# Patient Record
Sex: MALE | Race: WHITE | Employment: FULL TIME | URBAN - METROPOLITAN AREA
[De-identification: names, ages, dates, MRNs, and addresses within clinical notes are randomized per-mention and may not be internally consistent; named-entity substitution may affect disease eponyms.]

---

## 2018-11-19 ENCOUNTER — OFFICE VISIT (OUTPATIENT)
Dept: ENDOCRINOLOGY | Age: 28
End: 2018-11-19
Payer: COMMERCIAL

## 2018-11-19 VITALS
SYSTOLIC BLOOD PRESSURE: 116 MMHG | DIASTOLIC BLOOD PRESSURE: 81 MMHG | WEIGHT: 292.4 LBS | HEIGHT: 70 IN | HEART RATE: 70 BPM | BODY MASS INDEX: 41.86 KG/M2

## 2018-11-19 DIAGNOSIS — E66.01 MORBID OBESITY (HCC): ICD-10-CM

## 2018-11-19 DIAGNOSIS — E29.1 HYPOGONADISM MALE: Primary | ICD-10-CM

## 2018-11-19 DIAGNOSIS — E29.1 HYPOGONADISM MALE: ICD-10-CM

## 2018-11-19 DIAGNOSIS — N62 GYNECOMASTIA: ICD-10-CM

## 2018-11-19 DIAGNOSIS — E88.81 INSULIN RESISTANCE: ICD-10-CM

## 2018-11-19 DIAGNOSIS — R63.5 WEIGHT GAIN: ICD-10-CM

## 2018-11-19 PROBLEM — J30.2 SEASONAL ALLERGIES: Status: ACTIVE | Noted: 2018-11-19

## 2018-11-19 LAB
ANION GAP SERPL CALCULATED.3IONS-SCNC: 12 MEQ/L (ref 7–13)
BUN BLDV-MCNC: 14 MG/DL (ref 6–20)
CALCIUM SERPL-MCNC: 10.2 MG/DL (ref 8.6–10.2)
CHLORIDE BLD-SCNC: 106 MEQ/L (ref 98–107)
CO2: 27 MEQ/L (ref 22–29)
CREAT SERPL-MCNC: 0.85 MG/DL (ref 0.7–1.2)
GFR AFRICAN AMERICAN: >60
GFR NON-AFRICAN AMERICAN: >60
GLUCOSE BLD-MCNC: 87 MG/DL (ref 74–109)
HBA1C MFR BLD: 5.4 % (ref 4.8–5.9)
INSULIN: 15.9 UIU/ML (ref 2.6–24.9)
POTASSIUM SERPL-SCNC: 4.2 MEQ/L (ref 3.5–5.1)
SODIUM BLD-SCNC: 145 MEQ/L (ref 132–144)

## 2018-11-19 PROCEDURE — G8427 DOCREV CUR MEDS BY ELIG CLIN: HCPCS | Performed by: INTERNAL MEDICINE

## 2018-11-19 PROCEDURE — G8417 CALC BMI ABV UP PARAM F/U: HCPCS | Performed by: INTERNAL MEDICINE

## 2018-11-19 PROCEDURE — G8484 FLU IMMUNIZE NO ADMIN: HCPCS | Performed by: INTERNAL MEDICINE

## 2018-11-19 PROCEDURE — 99243 OFF/OP CNSLTJ NEW/EST LOW 30: CPT | Performed by: INTERNAL MEDICINE

## 2018-11-19 RX ORDER — TESTOSTERONE 16.2 MG/G
GEL TRANSDERMAL
Qty: 1 BOTTLE | Refills: 3 | Status: SHIPPED | OUTPATIENT
Start: 2018-11-19 | End: 2019-01-03 | Stop reason: SDUPTHER

## 2018-11-19 RX ORDER — MONTELUKAST SODIUM 10 MG/1
10 TABLET ORAL
COMMUNITY
Start: 2017-03-31 | End: 2021-07-19 | Stop reason: ALTCHOICE

## 2018-11-19 RX ORDER — FEXOFENADINE HYDROCHLORIDE 60 MG/1
60 TABLET, FILM COATED ORAL
COMMUNITY

## 2018-11-19 RX ORDER — AZELASTINE HYDROCHLORIDE 0.5 MG/ML
SOLUTION/ DROPS OPHTHALMIC
COMMUNITY
Start: 2017-03-31 | End: 2021-07-19 | Stop reason: ALTCHOICE

## 2018-11-19 RX ORDER — TRIAMCINOLONE ACETONIDE 55 UG/1
2 SPRAY, METERED NASAL
COMMUNITY
Start: 2017-03-31 | End: 2021-07-19 | Stop reason: ALTCHOICE

## 2018-11-19 NOTE — PROGRESS NOTES
Subjective:      Patient ID: Rylee Bowser is a 25 y.o. male. Patient referred for hypogonadism  Other   This is a new (Hypogonadism) problem. The current episode started more than 1 year ago. The problem has been waxing and waning. Associated symptoms include fatigue. Associated symptoms comments:  complains of lack of energy increasing weight was diagnosed 3 years ago at the [facility] and has been on Clomid treatment for some time with modest response patient does have 2 biological children denies any head trauma testicular injury use of anabolic steroid does occasionally drink beer over the weekend  MI at this time is 41.96. Exacerbated by: Obesity. Treatments tried: clomid  The treatment provided moderate relief. Testosterone was as low as 145 and up to 460+ after being on Clomid    Labs from 2015 normal lites deficits prolactin level    Reviewed from [facility] testosterone was 145 from June 2015   lab panel including ACTH cortisol D TSH prolactin FSH free T4  And the [facility] was normal from August 5, 2015    Patient tried the Clomid for hypogonadism with some relief last labs were done in 2016 results were reviewed      Patient Active Problem List   Diagnosis    Hypogonadism in male    Morbid obesity ([facility] [address]    Seasonal allergies         Social History     Social History    Marital status:      Spouse name: N/A    Number of children: N/A    Years of education: N/A     Occupational History    Not on file. Social History Main Topics    Smoking status: Never Smoker    Smokeless tobacco: Never Used    Alcohol use Not on file    Drug use: Unknown    Sexual activity: Not on file     Other Topics Concern    Not on file     Social History Narrative    No narrative on file     No family history on file. No Known Allergies      Current Outpatient Prescriptions:     azelastine (OPTIVAR) 0.05 % ophthalmic solution, Use 1 Drop in both eyes twice daily as needed. , Disp: , Rfl:    Standing Expiration Date:   2019    Basic Metabolic Panel     Standing Status:   Future     Number of Occurrences:   1     Standing Expiration Date:   2019    Hemoglobin A1C     Standing Status:   Future     Number of Occurrences:   1     Standing Expiration Date:   2019    Insulin, Total     Standing Status:   Future     Number of Occurrences:   1     Standing Expiration Date:   2019    Testosterone Free And Total Male     Standing Status:   Future     Number of Occurrences:   1     Standing Expiration Date:   2019          Orders Placed This Encounter   Medications    Testosterone (ANDROGEL) 20.25 MG/ACT (1.62%) GEL gel     Si pump depression daily.      Dispense:  1 Bottle     Refill:  3       Discussed about testosterone replacement goal of testosterone reading of 5-600 range advised about transference 2 children for that testosterone gel follow up in 4-6 weeks time  Than 50% of 40 minutes spent in patient education counseling review of prior labs data  Made aware of insulin resistance obesity and risks for diabetes    Heidy Zamora MD

## 2018-11-22 LAB
SEX HORMONE BINDING GLOBULIN: 13 NMOL/L (ref 11–80)
TESTOSTERONE FREE PERCENT: 2.6 % (ref 1.6–2.9)
TESTOSTERONE FREE, CALC: 87 PG/ML (ref 47–244)
TESTOSTERONE TOTAL-MALE: 329 NG/DL (ref 300–1080)

## 2018-12-17 DIAGNOSIS — E29.1 HYPOGONADISM MALE: ICD-10-CM

## 2018-12-19 LAB
SEX HORMONE BINDING GLOBULIN: 12 NMOL/L (ref 11–80)
TESTOSTERONE FREE PERCENT: 2.7 % (ref 1.6–2.9)
TESTOSTERONE FREE, CALC: 63 PG/ML (ref 47–244)
TESTOSTERONE TOTAL-MALE: 238 NG/DL (ref 300–1080)

## 2019-01-03 ENCOUNTER — OFFICE VISIT (OUTPATIENT)
Dept: ENDOCRINOLOGY | Age: 29
End: 2019-01-03
Payer: COMMERCIAL

## 2019-01-03 VITALS
OXYGEN SATURATION: 93 % | HEIGHT: 69 IN | DIASTOLIC BLOOD PRESSURE: 78 MMHG | SYSTOLIC BLOOD PRESSURE: 127 MMHG | WEIGHT: 303 LBS | BODY MASS INDEX: 44.88 KG/M2 | HEART RATE: 69 BPM

## 2019-01-03 DIAGNOSIS — E29.1 HYPOGONADISM IN MALE: Primary | ICD-10-CM

## 2019-01-03 DIAGNOSIS — E29.1 HYPOGONADISM MALE: ICD-10-CM

## 2019-01-03 PROCEDURE — G8417 CALC BMI ABV UP PARAM F/U: HCPCS | Performed by: INTERNAL MEDICINE

## 2019-01-03 PROCEDURE — 99213 OFFICE O/P EST LOW 20 MIN: CPT | Performed by: INTERNAL MEDICINE

## 2019-01-03 PROCEDURE — G8427 DOCREV CUR MEDS BY ELIG CLIN: HCPCS | Performed by: INTERNAL MEDICINE

## 2019-01-03 PROCEDURE — 1036F TOBACCO NON-USER: CPT | Performed by: INTERNAL MEDICINE

## 2019-01-03 PROCEDURE — G8484 FLU IMMUNIZE NO ADMIN: HCPCS | Performed by: INTERNAL MEDICINE

## 2019-01-03 RX ORDER — TESTOSTERONE 16.2 MG/G
GEL TRANSDERMAL
Qty: 2 BOTTLE | Refills: 3 | Status: SHIPPED | OUTPATIENT
Start: 2019-01-03 | End: 2019-02-20 | Stop reason: SDUPTHER

## 2019-02-12 DIAGNOSIS — E29.1 HYPOGONADISM IN MALE: ICD-10-CM

## 2019-02-14 ENCOUNTER — OFFICE VISIT (OUTPATIENT)
Dept: ENDOCRINOLOGY | Age: 29
End: 2019-02-14
Payer: COMMERCIAL

## 2019-02-14 VITALS
OXYGEN SATURATION: 96 % | DIASTOLIC BLOOD PRESSURE: 69 MMHG | BODY MASS INDEX: 43.69 KG/M2 | WEIGHT: 295 LBS | HEART RATE: 77 BPM | HEIGHT: 69 IN | SYSTOLIC BLOOD PRESSURE: 105 MMHG

## 2019-02-14 DIAGNOSIS — R63.5 WEIGHT GAIN: ICD-10-CM

## 2019-02-14 DIAGNOSIS — E29.1 HYPOGONADISM IN MALE: Primary | ICD-10-CM

## 2019-02-14 PROCEDURE — G8484 FLU IMMUNIZE NO ADMIN: HCPCS | Performed by: INTERNAL MEDICINE

## 2019-02-14 PROCEDURE — 1036F TOBACCO NON-USER: CPT | Performed by: INTERNAL MEDICINE

## 2019-02-14 PROCEDURE — G8427 DOCREV CUR MEDS BY ELIG CLIN: HCPCS | Performed by: INTERNAL MEDICINE

## 2019-02-14 PROCEDURE — 99213 OFFICE O/P EST LOW 20 MIN: CPT | Performed by: INTERNAL MEDICINE

## 2019-02-14 PROCEDURE — G8417 CALC BMI ABV UP PARAM F/U: HCPCS | Performed by: INTERNAL MEDICINE

## 2019-02-15 LAB
SEX HORMONE BINDING GLOBULIN: 11 NMOL/L (ref 11–80)
TESTOSTERONE FREE PERCENT: 2.7 % (ref 1.6–2.9)
TESTOSTERONE FREE, CALC: 42 PG/ML (ref 47–244)
TESTOSTERONE TOTAL-MALE: 159 NG/DL (ref 300–1080)

## 2019-02-20 DIAGNOSIS — E29.1 HYPOGONADISM MALE: ICD-10-CM

## 2019-02-20 RX ORDER — TESTOSTERONE 16.2 MG/G
GEL TRANSDERMAL
Qty: 2 BOTTLE | Refills: 3 | Status: SHIPPED | OUTPATIENT
Start: 2019-02-20 | End: 2019-04-23 | Stop reason: ALTCHOICE

## 2019-02-21 ENCOUNTER — TELEPHONE (OUTPATIENT)
Dept: ENDOCRINOLOGY | Age: 29
End: 2019-02-21

## 2019-04-15 DIAGNOSIS — E29.1 HYPOGONADISM IN MALE: ICD-10-CM

## 2019-04-18 LAB
SEX HORMONE BINDING GLOBULIN: 13 NMOL/L (ref 11–80)
TESTOSTERONE FREE PERCENT: 2.6 % (ref 1.6–2.9)
TESTOSTERONE FREE, CALC: 65 PG/ML (ref 47–244)
TESTOSTERONE TOTAL-MALE: 252 NG/DL (ref 300–1080)

## 2019-04-23 ENCOUNTER — OFFICE VISIT (OUTPATIENT)
Dept: ENDOCRINOLOGY | Age: 29
End: 2019-04-23
Payer: COMMERCIAL

## 2019-04-23 VITALS
WEIGHT: 297 LBS | SYSTOLIC BLOOD PRESSURE: 112 MMHG | HEART RATE: 62 BPM | DIASTOLIC BLOOD PRESSURE: 77 MMHG | BODY MASS INDEX: 43.99 KG/M2 | HEIGHT: 69 IN

## 2019-04-23 DIAGNOSIS — E66.01 MORBID OBESITY (HCC): ICD-10-CM

## 2019-04-23 DIAGNOSIS — E29.1 HYPOGONADISM IN MALE: Primary | ICD-10-CM

## 2019-04-23 PROCEDURE — G8417 CALC BMI ABV UP PARAM F/U: HCPCS | Performed by: INTERNAL MEDICINE

## 2019-04-23 PROCEDURE — 99213 OFFICE O/P EST LOW 20 MIN: CPT | Performed by: INTERNAL MEDICINE

## 2019-04-23 PROCEDURE — 1036F TOBACCO NON-USER: CPT | Performed by: INTERNAL MEDICINE

## 2019-04-23 PROCEDURE — G8427 DOCREV CUR MEDS BY ELIG CLIN: HCPCS | Performed by: INTERNAL MEDICINE

## 2019-04-23 RX ORDER — TESTOSTERONE CYPIONATE 200 MG/ML
VIAL (ML) INTRAMUSCULAR
Qty: 10 ML | Refills: 1 | Status: SHIPPED | OUTPATIENT
Start: 2019-04-23 | End: 2019-09-18 | Stop reason: SDUPTHER

## 2019-04-23 NOTE — PROGRESS NOTES
Subjective:      Patient ID: Miguelina Jackson is a 25 y.o. male. 8  week f/u on hypogonadism  Other   This is a chronic (Hypogonadism) problem. The current episode started more than 1 year ago. The problem has been waxing and waning. Associated symptoms include fatigue. Associated symptoms comments:    . Exacerbated by: Obesity. Treatments tried: androgel. Testosterone level done     Last level still low at 252 target testosterone level 500-600 range  Obesity Body mass index is 43.86 kg/m². No Known Allergies      Current Outpatient Medications:     azelastine (OPTIVAR) 0.05 % ophthalmic solution, Use 1 Drop in both eyes twice daily as needed. , Disp: , Rfl:     fexofenadine (ALLEGRA) 60 MG tablet, Take 60 mg by mouth, Disp: , Rfl:     montelukast (SINGULAIR) 10 MG tablet, Take 10 mg by mouth, Disp: , Rfl:     triamcinolone (NASACORT) 55 MCG/ACT nasal inhaler, 2 sprays, Disp: , Rfl:     Testosterone (ANDROGEL) 20.25 MG/ACT (1.62%) GEL gel, 4 pump depression daily. , Disp: 2 Bottle, Rfl: 3      Review of Systems   Constitutional: Positive for fatigue. All other systems reviewed and are negative. Vitals:    19 0925   BP: 112/77   Pulse: 62   Weight: 297 lb (134.7 kg)   Height: 5' 9\" (1.753 m)       Objective:   Physical Exam   Constitutional: He appears well-developed and well-nourished. Neck: Normal range of motion. Cardiovascular: Normal rate. Pulmonary/Chest: Effort normal.   Musculoskeletal: Normal range of motion. Neurological: He is alert. Assessment:       Diagnosis Orders   1. Hypogonadism in male     2.  Morbid obesity ([facility] [address]             Plan:      Orders Placed This Encounter   Procedures    Testosterone Free And Total Male     Standing Status:   Future     Standing Expiration Date:   2020     Orders Placed This Encounter   Medications    Testosterone Cypionate 200 MG/ML SOLN     Si cc every 2 weeks     Dispense:  10 mL     Refill:  1    SYRINGE-NEEDLE, DISP, 3 ML (B-D INTEGRA SYRINGE) 22G X 1-1/2\" 3 ML MISC     Si each by Does not apply route daily     Dispense:  20 each     Refill:  6     Medications Discontinued During This Encounter   Medication Reason    Testosterone (ANDROGEL) 20.25 MG/ACT (1.62%) GEL gel Therapy completed                 Luisa Ewing MD

## 2019-04-30 ENCOUNTER — NURSE ONLY (OUTPATIENT)
Dept: ENDOCRINOLOGY | Age: 29
End: 2019-04-30
Payer: COMMERCIAL

## 2019-04-30 DIAGNOSIS — E29.1 HYPOGONADISM IN MALE: Primary | ICD-10-CM

## 2019-04-30 PROCEDURE — 96372 THER/PROPH/DIAG INJ SC/IM: CPT | Performed by: INTERNAL MEDICINE

## 2019-04-30 RX ORDER — TESTOSTERONE CYPIONATE 200 MG/ML
200 INJECTION INTRAMUSCULAR ONCE
Status: COMPLETED | OUTPATIENT
Start: 2019-04-30 | End: 2019-04-30

## 2019-04-30 RX ADMIN — TESTOSTERONE CYPIONATE 200 MG: 200 INJECTION INTRAMUSCULAR at 16:23

## 2019-04-30 NOTE — PROGRESS NOTES
Patient given Testosterone 200mg IM right gluteal..  Self administration instructions given. Plans to have wife administer injections. Patient tolerated injection well.     Administrations This Visit     testosterone cypionate (DEPOTESTOTERONE CYPIONATE) injection 200 mg     Admin Date  04/30/2019 Action  Given Dose  200 mg Route  Intramuscular Administered By  Chapo Trevino

## 2019-05-07 ENCOUNTER — OFFICE VISIT (OUTPATIENT)
Dept: FAMILY MEDICINE CLINIC | Age: 29
End: 2019-05-07
Payer: COMMERCIAL

## 2019-05-07 VITALS
WEIGHT: 298 LBS | OXYGEN SATURATION: 98 % | TEMPERATURE: 97.7 F | SYSTOLIC BLOOD PRESSURE: 102 MMHG | HEART RATE: 67 BPM | BODY MASS INDEX: 44.01 KG/M2 | DIASTOLIC BLOOD PRESSURE: 67 MMHG

## 2019-05-07 DIAGNOSIS — J02.9 SORE THROAT: ICD-10-CM

## 2019-05-07 DIAGNOSIS — J02.0 ACUTE STREPTOCOCCAL PHARYNGITIS: Primary | ICD-10-CM

## 2019-05-07 DIAGNOSIS — E29.1 HYPOGONADISM IN MALE: ICD-10-CM

## 2019-05-07 LAB — S PYO AG THROAT QL: POSITIVE

## 2019-05-07 PROCEDURE — 99213 OFFICE O/P EST LOW 20 MIN: CPT | Performed by: NURSE PRACTITIONER

## 2019-05-07 PROCEDURE — 87880 STREP A ASSAY W/OPTIC: CPT | Performed by: NURSE PRACTITIONER

## 2019-05-07 PROCEDURE — G8427 DOCREV CUR MEDS BY ELIG CLIN: HCPCS | Performed by: NURSE PRACTITIONER

## 2019-05-07 PROCEDURE — G8417 CALC BMI ABV UP PARAM F/U: HCPCS | Performed by: NURSE PRACTITIONER

## 2019-05-07 PROCEDURE — 1036F TOBACCO NON-USER: CPT | Performed by: NURSE PRACTITIONER

## 2019-05-07 RX ORDER — TESTOSTERONE CYPIONATE 200 MG/ML
INJECTION INTRAMUSCULAR
COMMUNITY
Start: 2019-04-23 | End: 2019-05-14 | Stop reason: SDUPTHER

## 2019-05-07 RX ORDER — AMOXICILLIN 875 MG/1
875 TABLET, COATED ORAL 2 TIMES DAILY
Qty: 20 TABLET | Refills: 0 | Status: SHIPPED | OUTPATIENT
Start: 2019-05-07 | End: 2019-05-17

## 2019-05-07 ASSESSMENT — ENCOUNTER SYMPTOMS
VOMITING: 0
NAUSEA: 0
CHANGE IN BOWEL HABIT: 0
SORE THROAT: 1
COUGH: 0

## 2019-05-07 NOTE — PROGRESS NOTES
on file   Social History Narrative    Not on file       Allergies:  Patient has no known allergies. Review of Systems   Constitutional: Positive for fatigue and fever. HENT: Positive for congestion and sore throat. Respiratory: Negative for cough. Gastrointestinal: Negative for change in bowel habit, nausea and vomiting. Objective:   /67 (Site: Right Upper Arm, Position: Sitting, Cuff Size: Large Adult)   Pulse 67   Temp 97.7 °F (36.5 °C) (Temporal)   Wt 298 lb (135.2 kg)   SpO2 98%   BMI 44.01 kg/m²   Physical Exam   Constitutional: He appears well-developed and well-nourished. He does not appear ill. No distress. HENT:   Right Ear: Tympanic membrane normal.   Left Ear: Tympanic membrane normal.   Nose: Nose normal.   Mouth/Throat: Posterior oropharyngeal erythema present. Palatal petechiae    Cardiovascular: Normal rate and regular rhythm. No murmur heard. Pulmonary/Chest: Effort normal and breath sounds normal. No respiratory distress. Skin: He is not diaphoretic. Results for POC orders placed in visit on 05/07/19   POCT rapid strep A   Result Value Ref Range    Strep A Ag Positive (A) None Detected         Assessment:       Diagnosis Orders   1. Acute streptococcal pharyngitis  amoxicillin (AMOXIL) 875 MG tablet   2. Sore throat  POCT rapid strep A           Plan:      Orders Placed This Encounter   Procedures    POCT rapid strep A     Orders Placed This Encounter   Medications    amoxicillin (AMOXIL) 875 MG tablet     Sig: Take 1 tablet by mouth 2 times daily for 10 days     Dispense:  20 tablet     Refill:  0     Positive for strep. Advised to take full course of antibiotic, even if feeling better. Educated on symptomatic therapies. Advised that strep is passed through contact with oral secretions so do not share cups, utensils, etc. Advised to get new toothbrush as well. Return to PCP if no improvement in 3-4 days. Patient verbalized understanding.        Return if symptoms worsen or fail to improve.     Eliza Lambert, APRN - CNP

## 2019-05-09 LAB
SEX HORMONE BINDING GLOBULIN: 10 NMOL/L (ref 11–80)
TESTOSTERONE FREE PERCENT: 2.9 % (ref 1.6–2.9)
TESTOSTERONE FREE, CALC: 116 PG/ML (ref 47–244)
TESTOSTERONE TOTAL-MALE: 405 NG/DL (ref 300–1080)

## 2019-05-14 ENCOUNTER — NURSE ONLY (OUTPATIENT)
Dept: ENDOCRINOLOGY | Age: 29
End: 2019-05-14
Payer: COMMERCIAL

## 2019-05-14 DIAGNOSIS — E29.1 HYPOGONADISM MALE: Primary | ICD-10-CM

## 2019-05-14 PROCEDURE — 96372 THER/PROPH/DIAG INJ SC/IM: CPT | Performed by: INTERNAL MEDICINE

## 2019-05-14 RX ORDER — TESTOSTERONE CYPIONATE 200 MG/ML
INJECTION INTRAMUSCULAR
Qty: 10 ML | Refills: 1 | Status: SHIPPED | OUTPATIENT
Start: 2019-05-14 | End: 2019-06-10 | Stop reason: SDUPTHER

## 2019-05-14 RX ORDER — TESTOSTERONE CYPIONATE 200 MG/ML
200 INJECTION INTRAMUSCULAR ONCE
Status: COMPLETED | OUTPATIENT
Start: 2019-05-14 | End: 2019-05-14

## 2019-05-14 RX ADMIN — TESTOSTERONE CYPIONATE 200 MG: 200 INJECTION INTRAMUSCULAR at 15:21

## 2019-05-14 NOTE — PROGRESS NOTES
Patient given Testosterone 200mg IM left gluteal..  Will return in 2 weeks for next injection    Patient tolerated injection well.     Administrations This Visit     testosterone cypionate (DEPOTESTOTERONE CYPIONATE) injection 200 mg     Admin Date  05/14/2019 Action  Given Dose  200 mg Route  Intramuscular Administered By  Lavinia Wesley

## 2019-05-28 ENCOUNTER — NURSE ONLY (OUTPATIENT)
Dept: ENDOCRINOLOGY | Age: 29
End: 2019-05-28
Payer: COMMERCIAL

## 2019-05-28 DIAGNOSIS — E29.1 HYPOGONADISM MALE: Primary | ICD-10-CM

## 2019-05-28 PROCEDURE — 96372 THER/PROPH/DIAG INJ SC/IM: CPT | Performed by: INTERNAL MEDICINE

## 2019-05-28 RX ORDER — TESTOSTERONE CYPIONATE 200 MG/ML
200 INJECTION INTRAMUSCULAR ONCE
Status: COMPLETED | OUTPATIENT
Start: 2019-05-28 | End: 2019-05-28

## 2019-05-28 RX ADMIN — TESTOSTERONE CYPIONATE 200 MG: 200 INJECTION INTRAMUSCULAR at 15:55

## 2019-06-10 DIAGNOSIS — E29.1 HYPOGONADISM MALE: ICD-10-CM

## 2019-06-10 RX ORDER — TESTOSTERONE CYPIONATE 200 MG/ML
INJECTION INTRAMUSCULAR
Qty: 10 ML | Refills: 1 | Status: SHIPPED | OUTPATIENT
Start: 2019-06-10 | End: 2019-07-29 | Stop reason: SDUPTHER

## 2019-06-11 ENCOUNTER — NURSE ONLY (OUTPATIENT)
Dept: ENDOCRINOLOGY | Age: 29
End: 2019-06-11
Payer: COMMERCIAL

## 2019-06-11 DIAGNOSIS — E29.1 HYPOGONADISM MALE: Primary | ICD-10-CM

## 2019-06-11 PROCEDURE — 96372 THER/PROPH/DIAG INJ SC/IM: CPT | Performed by: INTERNAL MEDICINE

## 2019-06-11 RX ORDER — TESTOSTERONE CYPIONATE 200 MG/ML
200 INJECTION INTRAMUSCULAR ONCE
Status: COMPLETED | OUTPATIENT
Start: 2019-06-11 | End: 2019-06-11

## 2019-06-11 RX ADMIN — TESTOSTERONE CYPIONATE 200 MG: 200 INJECTION INTRAMUSCULAR at 15:58

## 2019-06-11 NOTE — PROGRESS NOTES
Patient given Testosterone 200mg IM left gluteal..  Will return in 2 weeks for next injection    Patient tolerated injection well.     Administrations This Visit     testosterone cypionate (DEPOTESTOTERONE CYPIONATE) injection 200 mg     Admin Date  06/11/2019 Action  Given Dose  200 mg Route  Intramuscular Administered By  Sunday Fay LPN

## 2019-06-25 ENCOUNTER — NURSE ONLY (OUTPATIENT)
Dept: ENDOCRINOLOGY | Age: 29
End: 2019-06-25
Payer: COMMERCIAL

## 2019-06-25 DIAGNOSIS — E29.1 HYPOGONADISM MALE: Primary | ICD-10-CM

## 2019-06-25 PROCEDURE — 96372 THER/PROPH/DIAG INJ SC/IM: CPT | Performed by: INTERNAL MEDICINE

## 2019-06-25 RX ORDER — TESTOSTERONE CYPIONATE 200 MG/ML
200 INJECTION INTRAMUSCULAR ONCE
Status: COMPLETED | OUTPATIENT
Start: 2019-06-25 | End: 2019-06-25

## 2019-06-25 RX ADMIN — TESTOSTERONE CYPIONATE 200 MG: 200 INJECTION INTRAMUSCULAR at 15:22

## 2019-07-17 ENCOUNTER — NURSE ONLY (OUTPATIENT)
Dept: ENDOCRINOLOGY | Age: 29
End: 2019-07-17
Payer: COMMERCIAL

## 2019-07-17 DIAGNOSIS — E29.1 HYPOGONADISM MALE: Primary | ICD-10-CM

## 2019-07-17 PROCEDURE — 96372 THER/PROPH/DIAG INJ SC/IM: CPT | Performed by: INTERNAL MEDICINE

## 2019-07-17 RX ORDER — TESTOSTERONE CYPIONATE 200 MG/ML
200 INJECTION INTRAMUSCULAR ONCE
Status: COMPLETED | OUTPATIENT
Start: 2019-07-17 | End: 2019-07-17

## 2019-07-17 RX ADMIN — TESTOSTERONE CYPIONATE 200 MG: 200 INJECTION INTRAMUSCULAR at 15:36

## 2019-07-24 ENCOUNTER — TELEPHONE (OUTPATIENT)
Dept: ENDOCRINOLOGY | Age: 29
End: 2019-07-24

## 2019-07-24 DIAGNOSIS — E29.1 HYPOGONADISM IN MALE: ICD-10-CM

## 2019-07-24 DIAGNOSIS — E29.1 HYPOGONADISM IN MALE: Primary | ICD-10-CM

## 2019-07-26 LAB
SEX HORMONE BINDING GLOBULIN: 12 NMOL/L (ref 11–80)
TESTOSTERONE FREE PERCENT: 2.8 % (ref 1.6–2.9)
TESTOSTERONE FREE, CALC: 153 PG/ML (ref 47–244)
TESTOSTERONE TOTAL-MALE: 548 NG/DL (ref 300–1080)

## 2019-07-29 ENCOUNTER — OFFICE VISIT (OUTPATIENT)
Dept: ENDOCRINOLOGY | Age: 29
End: 2019-07-29
Payer: COMMERCIAL

## 2019-07-29 VITALS
DIASTOLIC BLOOD PRESSURE: 69 MMHG | SYSTOLIC BLOOD PRESSURE: 109 MMHG | HEART RATE: 61 BPM | WEIGHT: 301 LBS | HEIGHT: 69 IN | BODY MASS INDEX: 44.58 KG/M2

## 2019-07-29 DIAGNOSIS — E29.1 HYPOGONADISM IN MALE: Primary | ICD-10-CM

## 2019-07-29 DIAGNOSIS — E29.1 HYPOGONADISM MALE: ICD-10-CM

## 2019-07-29 PROCEDURE — 99213 OFFICE O/P EST LOW 20 MIN: CPT | Performed by: INTERNAL MEDICINE

## 2019-07-29 PROCEDURE — 1036F TOBACCO NON-USER: CPT | Performed by: INTERNAL MEDICINE

## 2019-07-29 PROCEDURE — G8427 DOCREV CUR MEDS BY ELIG CLIN: HCPCS | Performed by: INTERNAL MEDICINE

## 2019-07-29 PROCEDURE — G8417 CALC BMI ABV UP PARAM F/U: HCPCS | Performed by: INTERNAL MEDICINE

## 2019-07-29 RX ORDER — TESTOSTERONE CYPIONATE 200 MG/ML
INJECTION INTRAMUSCULAR
Qty: 10 ML | Refills: 1 | Status: SHIPPED | OUTPATIENT
Start: 2019-07-29 | End: 2019-09-03 | Stop reason: SDUPTHER

## 2019-07-31 ENCOUNTER — NURSE ONLY (OUTPATIENT)
Dept: ENDOCRINOLOGY | Age: 29
End: 2019-07-31
Payer: COMMERCIAL

## 2019-07-31 DIAGNOSIS — E29.1 HYPOGONADISM IN MALE: Primary | ICD-10-CM

## 2019-07-31 PROCEDURE — 96372 THER/PROPH/DIAG INJ SC/IM: CPT | Performed by: INTERNAL MEDICINE

## 2019-07-31 RX ORDER — TESTOSTERONE CYPIONATE 200 MG/ML
200 INJECTION INTRAMUSCULAR ONCE
Status: COMPLETED | OUTPATIENT
Start: 2019-07-31 | End: 2019-07-31

## 2019-07-31 RX ADMIN — TESTOSTERONE CYPIONATE 200 MG: 200 INJECTION INTRAMUSCULAR at 13:58

## 2019-08-21 ENCOUNTER — NURSE ONLY (OUTPATIENT)
Dept: ENDOCRINOLOGY | Age: 29
End: 2019-08-21
Payer: COMMERCIAL

## 2019-08-21 DIAGNOSIS — E29.1 HYPOGONADISM IN MALE: Primary | ICD-10-CM

## 2019-08-21 PROCEDURE — 96372 THER/PROPH/DIAG INJ SC/IM: CPT | Performed by: INTERNAL MEDICINE

## 2019-08-21 RX ORDER — TESTOSTERONE CYPIONATE 200 MG/ML
200 INJECTION INTRAMUSCULAR ONCE
Status: COMPLETED | OUTPATIENT
Start: 2019-08-21 | End: 2019-08-21

## 2019-08-21 RX ADMIN — TESTOSTERONE CYPIONATE 200 MG: 200 INJECTION INTRAMUSCULAR at 13:27

## 2019-09-03 DIAGNOSIS — E29.1 HYPOGONADISM MALE: ICD-10-CM

## 2019-09-04 ENCOUNTER — NURSE ONLY (OUTPATIENT)
Dept: ENDOCRINOLOGY | Age: 29
End: 2019-09-04
Payer: COMMERCIAL

## 2019-09-04 DIAGNOSIS — E29.1 HYPOGONADISM MALE: Primary | ICD-10-CM

## 2019-09-04 PROCEDURE — 96372 THER/PROPH/DIAG INJ SC/IM: CPT | Performed by: INTERNAL MEDICINE

## 2019-09-04 RX ORDER — TESTOSTERONE CYPIONATE 200 MG/ML
200 INJECTION INTRAMUSCULAR ONCE
Status: COMPLETED | OUTPATIENT
Start: 2019-09-04 | End: 2019-09-04

## 2019-09-04 RX ORDER — TESTOSTERONE CYPIONATE 200 MG/ML
INJECTION INTRAMUSCULAR
Qty: 10 ML | Refills: 1 | Status: SHIPPED | OUTPATIENT
Start: 2019-09-04 | End: 2020-01-29 | Stop reason: SDUPTHER

## 2019-09-04 RX ADMIN — TESTOSTERONE CYPIONATE 200 MG: 200 INJECTION INTRAMUSCULAR at 13:29

## 2019-09-18 ENCOUNTER — NURSE ONLY (OUTPATIENT)
Dept: ENDOCRINOLOGY | Age: 29
End: 2019-09-18
Payer: COMMERCIAL

## 2019-09-18 DIAGNOSIS — E29.1 HYPOGONADISM IN MALE: ICD-10-CM

## 2019-09-18 PROCEDURE — 96372 THER/PROPH/DIAG INJ SC/IM: CPT | Performed by: INTERNAL MEDICINE

## 2019-09-18 RX ORDER — TESTOSTERONE CYPIONATE 200 MG/ML
VIAL (ML) INTRAMUSCULAR
Qty: 10 ML | Refills: 1 | Status: SHIPPED | OUTPATIENT
Start: 2019-09-18 | End: 2019-10-30

## 2019-09-18 RX ORDER — TESTOSTERONE CYPIONATE 200 MG/ML
VIAL (ML) INTRAMUSCULAR
Qty: 10 ML | Refills: 1 | Status: SHIPPED | OUTPATIENT
Start: 2019-09-18 | End: 2019-09-18 | Stop reason: SDUPTHER

## 2019-09-18 RX ORDER — TESTOSTERONE CYPIONATE 200 MG/ML
200 INJECTION INTRAMUSCULAR ONCE
Status: COMPLETED | OUTPATIENT
Start: 2019-09-18 | End: 2019-09-18

## 2019-09-18 RX ADMIN — TESTOSTERONE CYPIONATE 200 MG: 200 INJECTION INTRAMUSCULAR at 16:04

## 2019-09-18 NOTE — PROGRESS NOTES
Patient given Testosterone 200mg IM right gluteal..  Will return in 2 weeks for next injection    Patient tolerated injection well.     Administrations This Visit     testosterone cypionate (DEPOTESTOTERONE CYPIONATE) injection 200 mg     Admin Date  09/18/2019 Action  Given Dose  200 mg Route  Intramuscular Administered By  Kari Obrien MA

## 2019-10-09 ENCOUNTER — NURSE ONLY (OUTPATIENT)
Dept: ENDOCRINOLOGY | Age: 29
End: 2019-10-09
Payer: COMMERCIAL

## 2019-10-09 DIAGNOSIS — E29.1 HYPOGONADISM IN MALE: Primary | ICD-10-CM

## 2019-10-09 PROCEDURE — 96372 THER/PROPH/DIAG INJ SC/IM: CPT | Performed by: INTERNAL MEDICINE

## 2019-10-09 RX ORDER — TESTOSTERONE CYPIONATE 200 MG/ML
200 INJECTION INTRAMUSCULAR ONCE
Status: COMPLETED | OUTPATIENT
Start: 2019-10-09 | End: 2019-10-09

## 2019-10-09 RX ADMIN — TESTOSTERONE CYPIONATE 200 MG: 200 INJECTION INTRAMUSCULAR at 13:29

## 2019-10-30 ENCOUNTER — NURSE ONLY (OUTPATIENT)
Dept: ENDOCRINOLOGY | Age: 29
End: 2019-10-30
Payer: COMMERCIAL

## 2019-10-30 DIAGNOSIS — E29.1 HYPOGONADISM IN MALE: Primary | ICD-10-CM

## 2019-10-30 PROCEDURE — 96372 THER/PROPH/DIAG INJ SC/IM: CPT | Performed by: INTERNAL MEDICINE

## 2019-10-30 RX ORDER — TESTOSTERONE CYPIONATE 200 MG/ML
200 INJECTION INTRAMUSCULAR ONCE
Status: COMPLETED | OUTPATIENT
Start: 2019-10-30 | End: 2019-10-30

## 2019-10-30 RX ADMIN — TESTOSTERONE CYPIONATE 200 MG: 200 INJECTION INTRAMUSCULAR at 13:33

## 2019-11-13 ENCOUNTER — NURSE ONLY (OUTPATIENT)
Dept: ENDOCRINOLOGY | Age: 29
End: 2019-11-13
Payer: COMMERCIAL

## 2019-11-13 DIAGNOSIS — E29.1 HYPOGONADISM IN MALE: Primary | ICD-10-CM

## 2019-11-13 PROCEDURE — 96372 THER/PROPH/DIAG INJ SC/IM: CPT | Performed by: INTERNAL MEDICINE

## 2019-11-13 RX ORDER — TESTOSTERONE CYPIONATE 200 MG/ML
200 INJECTION INTRAMUSCULAR ONCE
Status: COMPLETED | OUTPATIENT
Start: 2019-11-13 | End: 2019-11-13

## 2019-11-13 RX ADMIN — TESTOSTERONE CYPIONATE 200 MG: 200 INJECTION INTRAMUSCULAR at 13:28

## 2019-11-27 ENCOUNTER — NURSE ONLY (OUTPATIENT)
Dept: ENDOCRINOLOGY | Age: 29
End: 2019-11-27
Payer: COMMERCIAL

## 2019-11-27 DIAGNOSIS — E29.1 HYPOGONADISM IN MALE: Primary | ICD-10-CM

## 2019-11-27 PROCEDURE — 96372 THER/PROPH/DIAG INJ SC/IM: CPT | Performed by: INTERNAL MEDICINE

## 2019-11-27 RX ORDER — TESTOSTERONE CYPIONATE 200 MG/ML
200 INJECTION INTRAMUSCULAR ONCE
Status: COMPLETED | OUTPATIENT
Start: 2019-11-27 | End: 2019-11-27

## 2019-11-27 RX ADMIN — TESTOSTERONE CYPIONATE 200 MG: 200 INJECTION INTRAMUSCULAR at 15:24

## 2019-12-11 ENCOUNTER — NURSE ONLY (OUTPATIENT)
Dept: ENDOCRINOLOGY | Age: 29
End: 2019-12-11
Payer: COMMERCIAL

## 2019-12-11 DIAGNOSIS — E29.1 HYPOGONADISM IN MALE: Primary | ICD-10-CM

## 2019-12-11 PROCEDURE — 96372 THER/PROPH/DIAG INJ SC/IM: CPT | Performed by: INTERNAL MEDICINE

## 2019-12-11 RX ORDER — TESTOSTERONE CYPIONATE 200 MG/ML
200 INJECTION INTRAMUSCULAR ONCE
Status: COMPLETED | OUTPATIENT
Start: 2019-12-11 | End: 2019-12-11

## 2019-12-11 RX ADMIN — TESTOSTERONE CYPIONATE 200 MG: 200 INJECTION INTRAMUSCULAR at 15:26

## 2019-12-23 ENCOUNTER — OFFICE VISIT (OUTPATIENT)
Dept: FAMILY MEDICINE CLINIC | Age: 29
End: 2019-12-23
Payer: COMMERCIAL

## 2019-12-23 DIAGNOSIS — E29.1 HYPOGONADISM IN MALE: Primary | ICD-10-CM

## 2019-12-23 PROCEDURE — G8428 CUR MEDS NOT DOCUMENT: HCPCS | Performed by: FAMILY MEDICINE

## 2019-12-23 PROCEDURE — G8417 CALC BMI ABV UP PARAM F/U: HCPCS | Performed by: FAMILY MEDICINE

## 2019-12-23 PROCEDURE — 99211 OFF/OP EST MAY X REQ PHY/QHP: CPT | Performed by: FAMILY MEDICINE

## 2019-12-23 PROCEDURE — 96372 THER/PROPH/DIAG INJ SC/IM: CPT | Performed by: FAMILY MEDICINE

## 2019-12-23 RX ORDER — TESTOSTERONE CYPIONATE 200 MG/ML
200 INJECTION INTRAMUSCULAR ONCE
Status: COMPLETED | OUTPATIENT
Start: 2019-12-23 | End: 2019-12-23

## 2019-12-23 RX ADMIN — TESTOSTERONE CYPIONATE 200 MG: 200 INJECTION INTRAMUSCULAR at 13:11

## 2020-01-08 ENCOUNTER — NURSE ONLY (OUTPATIENT)
Dept: ENDOCRINOLOGY | Age: 30
End: 2020-01-08
Payer: COMMERCIAL

## 2020-01-08 PROCEDURE — 96372 THER/PROPH/DIAG INJ SC/IM: CPT | Performed by: INTERNAL MEDICINE

## 2020-01-08 RX ORDER — TESTOSTERONE CYPIONATE 200 MG/ML
200 INJECTION INTRAMUSCULAR ONCE
Status: COMPLETED | OUTPATIENT
Start: 2020-01-08 | End: 2020-01-08

## 2020-01-08 RX ADMIN — TESTOSTERONE CYPIONATE 200 MG: 200 INJECTION INTRAMUSCULAR at 15:00

## 2020-01-22 ENCOUNTER — NURSE ONLY (OUTPATIENT)
Dept: ENDOCRINOLOGY | Age: 30
End: 2020-01-22
Payer: COMMERCIAL

## 2020-01-22 PROCEDURE — 96372 THER/PROPH/DIAG INJ SC/IM: CPT | Performed by: INTERNAL MEDICINE

## 2020-01-22 RX ORDER — TESTOSTERONE CYPIONATE 200 MG/ML
200 INJECTION INTRAMUSCULAR ONCE
Status: COMPLETED | OUTPATIENT
Start: 2020-01-22 | End: 2020-01-22

## 2020-01-22 RX ADMIN — TESTOSTERONE CYPIONATE 200 MG: 200 INJECTION INTRAMUSCULAR at 15:55

## 2020-01-22 NOTE — PROGRESS NOTES
Patient given Testosterone 200mg IM right gluteal..  Will return in 2 weeks for next injection    Patient tolerated injection well.     Administrations This Visit     testosterone cypionate (DEPOTESTOTERONE CYPIONATE) injection 200 mg     Admin Date  01/22/2020 Action  Given Dose  200 mg Route  Intramuscular Administered By  Ellen Shipley LPN

## 2020-01-27 ENCOUNTER — TELEPHONE (OUTPATIENT)
Dept: ENDOCRINOLOGY | Age: 30
End: 2020-01-27

## 2020-01-27 DIAGNOSIS — E05.90 HYPERTHYROIDISM: ICD-10-CM

## 2020-01-27 DIAGNOSIS — E29.1 HYPOGONADISM IN MALE: ICD-10-CM

## 2020-01-27 LAB
HCT VFR BLD CALC: 46.9 % (ref 42–52)
HEMOGLOBIN: 15.7 G/DL (ref 14–18)
MCH RBC QN AUTO: 29.3 PG (ref 27–31.3)
MCHC RBC AUTO-ENTMCNC: 33.5 % (ref 33–37)
MCV RBC AUTO: 87.6 FL (ref 80–100)
PDW BLD-RTO: 13.7 % (ref 11.5–14.5)
PLATELET # BLD: 267 K/UL (ref 130–400)
RBC # BLD: 5.36 M/UL (ref 4.7–6.1)
T4 FREE: 1.15 NG/DL (ref 0.84–1.68)
TSH REFLEX: 1.47 UIU/ML (ref 0.44–3.86)
WBC # BLD: 7.4 K/UL (ref 4.8–10.8)

## 2020-01-28 LAB
SEX HORMONE BINDING GLOBULIN: 7 NMOL/L (ref 11–80)
TESTOSTERONE FREE PERCENT: 3.1 % (ref 1.6–2.9)
TESTOSTERONE FREE, CALC: 211 PG/ML (ref 47–244)
TESTOSTERONE TOTAL-MALE: 673 NG/DL (ref 300–1080)

## 2020-01-29 ENCOUNTER — OFFICE VISIT (OUTPATIENT)
Dept: ENDOCRINOLOGY | Age: 30
End: 2020-01-29
Payer: COMMERCIAL

## 2020-01-29 VITALS
RESPIRATION RATE: 18 BRPM | BODY MASS INDEX: 43.38 KG/M2 | OXYGEN SATURATION: 95 % | SYSTOLIC BLOOD PRESSURE: 109 MMHG | HEART RATE: 66 BPM | HEIGHT: 70 IN | DIASTOLIC BLOOD PRESSURE: 71 MMHG | WEIGHT: 303 LBS

## 2020-01-29 DIAGNOSIS — L65.9 ALOPECIA: ICD-10-CM

## 2020-01-29 PROCEDURE — G8484 FLU IMMUNIZE NO ADMIN: HCPCS | Performed by: INTERNAL MEDICINE

## 2020-01-29 PROCEDURE — G8427 DOCREV CUR MEDS BY ELIG CLIN: HCPCS | Performed by: INTERNAL MEDICINE

## 2020-01-29 PROCEDURE — 1036F TOBACCO NON-USER: CPT | Performed by: INTERNAL MEDICINE

## 2020-01-29 PROCEDURE — 99213 OFFICE O/P EST LOW 20 MIN: CPT | Performed by: INTERNAL MEDICINE

## 2020-01-29 PROCEDURE — G8417 CALC BMI ABV UP PARAM F/U: HCPCS | Performed by: INTERNAL MEDICINE

## 2020-01-29 RX ORDER — TESTOSTERONE CYPIONATE 200 MG/ML
INJECTION INTRAMUSCULAR
Qty: 10 ML | Refills: 1 | Status: SHIPPED | OUTPATIENT
Start: 2020-01-29 | End: 2021-09-08 | Stop reason: SDUPTHER

## 2020-01-29 RX ORDER — TRIAMCINOLONE ACETONIDE 0.25 MG/G
CREAM TOPICAL
Qty: 1 TUBE | Refills: 0 | Status: SHIPPED | OUTPATIENT
Start: 2020-01-29

## 2020-01-29 ASSESSMENT — ENCOUNTER SYMPTOMS: ROS SKIN COMMENTS: HAIR LOSS

## 2020-01-30 LAB — THYROID PEROXIDASE (TPO) ABS: <10 IU/ML (ref 0–35)

## 2020-02-05 ENCOUNTER — NURSE ONLY (OUTPATIENT)
Dept: ENDOCRINOLOGY | Age: 30
End: 2020-02-05
Payer: COMMERCIAL

## 2020-02-05 PROCEDURE — 96372 THER/PROPH/DIAG INJ SC/IM: CPT | Performed by: INTERNAL MEDICINE

## 2020-02-05 RX ORDER — TESTOSTERONE CYPIONATE 200 MG/ML
200 INJECTION INTRAMUSCULAR ONCE
Status: COMPLETED | OUTPATIENT
Start: 2020-02-05 | End: 2020-02-05

## 2020-02-05 RX ADMIN — TESTOSTERONE CYPIONATE 200 MG: 200 INJECTION INTRAMUSCULAR at 14:54

## 2020-02-19 ENCOUNTER — NURSE ONLY (OUTPATIENT)
Dept: ENDOCRINOLOGY | Age: 30
End: 2020-02-19
Payer: COMMERCIAL

## 2020-02-19 PROCEDURE — 96372 THER/PROPH/DIAG INJ SC/IM: CPT | Performed by: INTERNAL MEDICINE

## 2020-02-19 RX ORDER — TESTOSTERONE CYPIONATE 200 MG/ML
200 INJECTION INTRAMUSCULAR ONCE
Status: COMPLETED | OUTPATIENT
Start: 2020-02-19 | End: 2020-02-19

## 2020-02-19 RX ADMIN — TESTOSTERONE CYPIONATE 200 MG: 200 INJECTION INTRAMUSCULAR at 15:00

## 2020-03-04 ENCOUNTER — NURSE ONLY (OUTPATIENT)
Dept: ENDOCRINOLOGY | Age: 30
End: 2020-03-04
Payer: COMMERCIAL

## 2020-03-04 PROCEDURE — 96372 THER/PROPH/DIAG INJ SC/IM: CPT | Performed by: INTERNAL MEDICINE

## 2020-03-04 RX ORDER — TESTOSTERONE CYPIONATE 200 MG/ML
200 INJECTION INTRAMUSCULAR ONCE
Status: COMPLETED | OUTPATIENT
Start: 2020-03-04 | End: 2020-03-04

## 2020-03-04 RX ADMIN — TESTOSTERONE CYPIONATE 200 MG: 200 INJECTION INTRAMUSCULAR at 15:12

## 2020-03-04 NOTE — PROGRESS NOTES
Patient given Testosterone 200mg IM right gluteal..  Will return in 2 weeks for next injection    Patient tolerated injection well.     Administrations This Visit     testosterone cypionate (DEPOTESTOTERONE CYPIONATE) injection 200 mg     Admin Date  03/04/2020 Action  Given Dose  200 mg Route  Intramuscular Administered By  Quynh Beck LPN

## 2020-03-18 ENCOUNTER — NURSE ONLY (OUTPATIENT)
Dept: ENDOCRINOLOGY | Age: 30
End: 2020-03-18
Payer: COMMERCIAL

## 2020-03-18 PROCEDURE — 96372 THER/PROPH/DIAG INJ SC/IM: CPT | Performed by: INTERNAL MEDICINE

## 2020-03-18 RX ORDER — TESTOSTERONE CYPIONATE 200 MG/ML
200 INJECTION INTRAMUSCULAR ONCE
Status: COMPLETED | OUTPATIENT
Start: 2020-03-18 | End: 2020-03-18

## 2020-03-18 RX ADMIN — TESTOSTERONE CYPIONATE 200 MG: 200 INJECTION INTRAMUSCULAR at 15:12

## 2020-04-08 ENCOUNTER — NURSE ONLY (OUTPATIENT)
Dept: FAMILY MEDICINE CLINIC | Age: 30
End: 2020-04-08
Payer: COMMERCIAL

## 2020-04-08 PROCEDURE — 96372 THER/PROPH/DIAG INJ SC/IM: CPT | Performed by: FAMILY MEDICINE

## 2020-04-08 RX ORDER — TESTOSTERONE CYPIONATE 200 MG/ML
200 INJECTION INTRAMUSCULAR ONCE
Status: COMPLETED | OUTPATIENT
Start: 2020-04-08 | End: 2020-04-08

## 2020-04-08 RX ADMIN — TESTOSTERONE CYPIONATE 200 MG: 200 INJECTION INTRAMUSCULAR at 12:51

## 2020-04-08 NOTE — PROGRESS NOTES
Patient given Testosterone 200mg IM right gluteal..  Will return in 2 weeks for next injection    Patient tolerated injection well.     Administrations This Visit     testosterone cypionate (DEPOTESTOTERONE CYPIONATE) injection 200 mg     Admin Date  04/08/2020 Action  Given Dose  200 mg Route  Intramuscular Administered By  Kaelyn Lim LPN

## 2020-04-29 ENCOUNTER — NURSE ONLY (OUTPATIENT)
Dept: FAMILY MEDICINE CLINIC | Age: 30
End: 2020-04-29
Payer: COMMERCIAL

## 2020-04-29 PROCEDURE — 96372 THER/PROPH/DIAG INJ SC/IM: CPT | Performed by: FAMILY MEDICINE

## 2020-04-29 RX ORDER — TESTOSTERONE CYPIONATE 200 MG/ML
200 INJECTION INTRAMUSCULAR ONCE
Status: COMPLETED | OUTPATIENT
Start: 2020-04-29 | End: 2020-04-29

## 2020-04-29 RX ADMIN — TESTOSTERONE CYPIONATE 200 MG: 200 INJECTION INTRAMUSCULAR at 14:37

## 2020-06-10 ENCOUNTER — NURSE ONLY (OUTPATIENT)
Dept: FAMILY MEDICINE CLINIC | Age: 30
End: 2020-06-10
Payer: COMMERCIAL

## 2020-06-10 PROCEDURE — 96372 THER/PROPH/DIAG INJ SC/IM: CPT | Performed by: FAMILY MEDICINE

## 2020-06-10 RX ORDER — TESTOSTERONE CYPIONATE 200 MG/ML
200 INJECTION INTRAMUSCULAR ONCE
Status: COMPLETED | OUTPATIENT
Start: 2020-06-10 | End: 2020-06-10

## 2020-06-10 RX ADMIN — TESTOSTERONE CYPIONATE 200 MG: 200 INJECTION INTRAMUSCULAR at 13:02

## 2020-06-24 ENCOUNTER — NURSE ONLY (OUTPATIENT)
Dept: ENDOCRINOLOGY | Age: 30
End: 2020-06-24
Payer: COMMERCIAL

## 2020-06-24 PROCEDURE — 96372 THER/PROPH/DIAG INJ SC/IM: CPT | Performed by: INTERNAL MEDICINE

## 2020-06-24 RX ORDER — TESTOSTERONE CYPIONATE 200 MG/ML
200 INJECTION INTRAMUSCULAR ONCE
Status: COMPLETED | OUTPATIENT
Start: 2020-06-24 | End: 2020-06-24

## 2020-06-24 RX ADMIN — TESTOSTERONE CYPIONATE 200 MG: 200 INJECTION INTRAMUSCULAR at 14:30

## 2020-06-24 NOTE — PROGRESS NOTES
Patient given Testosterone 200mg IM leftr gluteal..  Will return in 2 weeks for next injection    Patient tolerated injection well.     Administrations This Visit     testosterone cypionate (DEPOTESTOTERONE CYPIONATE) injection 200 mg     Admin Date  06/24/2020 Action  Given Dose  200 mg Route  Intramuscular Administered By  Meena Anthony LPN

## 2020-07-22 ENCOUNTER — NURSE ONLY (OUTPATIENT)
Dept: ENDOCRINOLOGY | Age: 30
End: 2020-07-22
Payer: COMMERCIAL

## 2020-07-22 PROCEDURE — 96372 THER/PROPH/DIAG INJ SC/IM: CPT | Performed by: INTERNAL MEDICINE

## 2020-07-22 RX ORDER — TESTOSTERONE CYPIONATE 200 MG/ML
200 INJECTION INTRAMUSCULAR ONCE
Status: COMPLETED | OUTPATIENT
Start: 2020-07-22 | End: 2020-07-22

## 2020-07-22 RX ADMIN — TESTOSTERONE CYPIONATE 200 MG: 200 INJECTION INTRAMUSCULAR at 14:17

## 2020-07-30 DIAGNOSIS — E29.1 HYPOGONADISM MALE: ICD-10-CM

## 2020-07-31 LAB
SEX HORMONE BINDING GLOBULIN: 8 NMOL/L (ref 11–80)
TESTOSTERONE FREE PERCENT: 3 % (ref 1.6–2.9)
TESTOSTERONE FREE, CALC: 121 PG/ML (ref 47–244)
TESTOSTERONE TOTAL-MALE: 406 NG/DL (ref 300–1080)

## 2020-08-05 ENCOUNTER — NURSE ONLY (OUTPATIENT)
Dept: ENDOCRINOLOGY | Age: 30
End: 2020-08-05
Payer: COMMERCIAL

## 2020-08-05 ENCOUNTER — OFFICE VISIT (OUTPATIENT)
Dept: ENDOCRINOLOGY | Age: 30
End: 2020-08-05
Payer: COMMERCIAL

## 2020-08-05 VITALS
BODY MASS INDEX: 42.9 KG/M2 | WEIGHT: 299 LBS | HEART RATE: 68 BPM | OXYGEN SATURATION: 97 % | SYSTOLIC BLOOD PRESSURE: 112 MMHG | DIASTOLIC BLOOD PRESSURE: 76 MMHG

## 2020-08-05 PROCEDURE — G8417 CALC BMI ABV UP PARAM F/U: HCPCS | Performed by: INTERNAL MEDICINE

## 2020-08-05 PROCEDURE — 1036F TOBACCO NON-USER: CPT | Performed by: INTERNAL MEDICINE

## 2020-08-05 PROCEDURE — 99213 OFFICE O/P EST LOW 20 MIN: CPT | Performed by: INTERNAL MEDICINE

## 2020-08-05 PROCEDURE — 96372 THER/PROPH/DIAG INJ SC/IM: CPT | Performed by: INTERNAL MEDICINE

## 2020-08-05 PROCEDURE — G8427 DOCREV CUR MEDS BY ELIG CLIN: HCPCS | Performed by: INTERNAL MEDICINE

## 2020-08-05 RX ORDER — TESTOSTERONE CYPIONATE 200 MG/ML
200 INJECTION INTRAMUSCULAR ONCE
Status: COMPLETED | OUTPATIENT
Start: 2020-08-05 | End: 2020-08-05

## 2020-08-05 RX ADMIN — TESTOSTERONE CYPIONATE 200 MG: 200 INJECTION INTRAMUSCULAR at 14:06

## 2020-08-05 NOTE — PROGRESS NOTES
Patient given Testosterone 200mg IM left gluteal..  Will return in 2 weeks for next injection    Patient tolerated injection well.     Administrations This Visit     testosterone cypionate (DEPOTESTOTERONE CYPIONATE) injection 200 mg     Admin Date  08/05/2020 Action  Given Dose  200 mg Route  Intramuscular Administered By  Remedios Neumann LPN

## 2020-08-05 NOTE — PROGRESS NOTES
Subjective:      Patient ID: Kateryna Mason is a 26 y.o. male. 7-month follow-up on hypogonadism lab review patient on testosterone injections through the clinic labs are stable no new active symptoms  Other   This is a chronic (Hypogonadism) problem. The current episode started more than 1 year ago. The problem has been gradually improving. Treatments tried: Testosterone injections. The treatment provided moderate relief. Morbid obesity Body mass index is 42.9 kg/m². Results for Gladys Schwab (MRN [id number]) as of 8/13/2020 09:37   Ref. Range 7/24/2019 15:01 1/27/2020 11:20 1/29/2020 09:47 7/30/2020 11:03   Sex Hormone Binding Latest Ref Range: 11 - 80 nmol/L 12 7 (L)  8 (L)   Testosterone Free, Calc Latest Ref Range: 47 - 244 pg/mL 153 211  121   Total Testosterone Latest Ref Range: 300 - 1080 ng/dL 548 673  406   Testosterone % Free Latest Ref Range: 1.6 - 2.9 % 2.8 3.1 (H)  3.0 (H)     Patient Active Problem List   Diagnosis    Hypogonadism in male    Morbid obesity (HCC)    Seasonal allergies     No Known Allergies      Current Outpatient Medications:     triamcinolone (KENALOG) 0.025 % cream, Apply topically 2 times daily. , Disp: 1 Tube, Rfl: 0    SYRINGE-NEEDLE, DISP, 3 ML (B-D INTEGRA SYRINGE) 22G X 1-1/2\" 3 ML MISC, 1 each by Does not apply route daily, Disp: 20 each, Rfl: 6    azelastine (OPTIVAR) 0.05 % ophthalmic solution, Use 1 Drop in both eyes twice daily as needed. , Disp: , Rfl:     fexofenadine (ALLEGRA) 60 MG tablet, Take 60 mg by mouth, Disp: , Rfl:     montelukast (SINGULAIR) 10 MG tablet, Take 10 mg by mouth, Disp: , Rfl:     triamcinolone (NASACORT) 55 MCG/ACT nasal inhaler, 2 sprays, Disp: , Rfl:     testosterone cypionate (DEPOTESTOTERONE CYPIONATE) 200 MG/ML injection, Inject 1mL Q 2 weeks, Disp: 10 mL, Rfl: 1      Review of Systems    Vitals:    08/05/20 1408   BP: 112/76   Pulse: 68   SpO2: 97%   Weight: 299 lb (135.6 kg)       Objective:   Physical Exam  Constitutional:       Appearance: Normal appearance. He is obese. HENT:      Head: Normocephalic and atraumatic. Neck:      Musculoskeletal: Normal range of motion and neck supple. Cardiovascular:      Rate and Rhythm: Normal rate. Musculoskeletal: Normal range of motion. Neurological:      Mental Status: He is alert. Psychiatric:         Mood and Affect: Mood normal.         Assessment:       Diagnosis Orders   1.  Hypogonadism in male  Testosterone Free and Total Male    CBC    Basic Metabolic Panel           Plan:      Orders Placed This Encounter   Procedures    Testosterone Free and Total Male     Standing Status:   Future     Standing Expiration Date:   8/5/2021    CBC     Standing Status:   Future     Standing Expiration Date:   8/5/2021    Basic Metabolic Panel     Standing Status:   Future     Standing Expiration Date:   8/5/2021     Continue current dose of testosterone injections patient to follow-up in 6 months with repeat labs          Madiha Conti MD

## 2020-08-12 ENCOUNTER — TELEPHONE (OUTPATIENT)
Dept: ENDOCRINOLOGY | Age: 30
End: 2020-08-12

## 2020-08-19 ENCOUNTER — NURSE ONLY (OUTPATIENT)
Dept: ENDOCRINOLOGY | Age: 30
End: 2020-08-19
Payer: COMMERCIAL

## 2020-08-19 PROCEDURE — 96372 THER/PROPH/DIAG INJ SC/IM: CPT | Performed by: INTERNAL MEDICINE

## 2020-08-19 RX ORDER — TESTOSTERONE CYPIONATE 200 MG/ML
200 INJECTION INTRAMUSCULAR ONCE
Status: COMPLETED | OUTPATIENT
Start: 2020-08-19 | End: 2020-08-19

## 2020-08-19 RX ADMIN — TESTOSTERONE CYPIONATE 200 MG: 200 INJECTION INTRAMUSCULAR at 14:59

## 2020-08-19 NOTE — PROGRESS NOTES
Patient given Testosterone 200mg IM right gluteal..  Will return in 2 weeks for next injection    Patient tolerated injection well.     Administrations This Visit     testosterone cypionate (DEPOTESTOTERONE CYPIONATE) injection 200 mg     Admin Date  08/19/2020 Action  Given Dose  200 mg Route  Intramuscular Administered By  Amberly Rodarte LPN

## 2020-09-02 ENCOUNTER — NURSE ONLY (OUTPATIENT)
Dept: ENDOCRINOLOGY | Age: 30
End: 2020-09-02
Payer: COMMERCIAL

## 2020-09-02 PROCEDURE — 96372 THER/PROPH/DIAG INJ SC/IM: CPT | Performed by: INTERNAL MEDICINE

## 2020-09-02 RX ORDER — TESTOSTERONE CYPIONATE 200 MG/ML
200 INJECTION INTRAMUSCULAR ONCE
Status: COMPLETED | OUTPATIENT
Start: 2020-09-02 | End: 2020-09-02

## 2020-09-02 RX ADMIN — TESTOSTERONE CYPIONATE 200 MG: 200 INJECTION INTRAMUSCULAR at 14:04

## 2020-09-02 NOTE — PROGRESS NOTES
Patient given Testosterone 200mg IM left gluteal..  Will return in 2 weeks for next injection    Patient tolerated injection well.     Administrations This Visit     testosterone cypionate (DEPOTESTOTERONE CYPIONATE) injection 200 mg     Admin Date  09/02/2020 Action  Given Dose  200 mg Route  Intramuscular Administered By  Ann Kemp LPN

## 2020-09-16 ENCOUNTER — NURSE ONLY (OUTPATIENT)
Dept: ENDOCRINOLOGY | Age: 30
End: 2020-09-16
Payer: COMMERCIAL

## 2020-09-16 PROCEDURE — 96372 THER/PROPH/DIAG INJ SC/IM: CPT | Performed by: INTERNAL MEDICINE

## 2020-09-16 RX ORDER — TESTOSTERONE CYPIONATE 200 MG/ML
200 INJECTION INTRAMUSCULAR ONCE
Status: COMPLETED | OUTPATIENT
Start: 2020-09-16 | End: 2020-09-16

## 2020-09-16 RX ADMIN — TESTOSTERONE CYPIONATE 200 MG: 200 INJECTION INTRAMUSCULAR at 14:36

## 2020-09-30 ENCOUNTER — NURSE ONLY (OUTPATIENT)
Dept: ENDOCRINOLOGY | Age: 30
End: 2020-09-30
Payer: COMMERCIAL

## 2020-09-30 PROCEDURE — 96372 THER/PROPH/DIAG INJ SC/IM: CPT | Performed by: INTERNAL MEDICINE

## 2020-09-30 RX ORDER — TESTOSTERONE CYPIONATE 200 MG/ML
200 INJECTION INTRAMUSCULAR ONCE
Status: COMPLETED | OUTPATIENT
Start: 2020-09-30 | End: 2020-09-30

## 2020-09-30 RX ADMIN — TESTOSTERONE CYPIONATE 200 MG: 200 INJECTION INTRAMUSCULAR at 15:07

## 2020-09-30 NOTE — PROGRESS NOTES
Patient given Testosterone 200mg IM left gluteal..  Will return in 2 weeks for next injection    Patient tolerated injection well.     Administrations This Visit     testosterone cypionate (DEPOTESTOTERONE CYPIONATE) injection 200 mg     Admin Date  09/30/2020 Action  Given Dose  200 mg Route  Intramuscular Administered By  Cristy Knutson LPN

## 2020-10-14 ENCOUNTER — NURSE ONLY (OUTPATIENT)
Dept: ENDOCRINOLOGY | Age: 30
End: 2020-10-14
Payer: COMMERCIAL

## 2020-10-14 PROCEDURE — 96372 THER/PROPH/DIAG INJ SC/IM: CPT | Performed by: INTERNAL MEDICINE

## 2020-10-14 RX ORDER — TESTOSTERONE CYPIONATE 200 MG/ML
200 INJECTION INTRAMUSCULAR ONCE
Status: COMPLETED | OUTPATIENT
Start: 2020-10-14 | End: 2020-10-14

## 2020-10-14 RX ADMIN — TESTOSTERONE CYPIONATE 200 MG: 200 INJECTION INTRAMUSCULAR at 15:03

## 2020-10-28 ENCOUNTER — NURSE ONLY (OUTPATIENT)
Dept: ENDOCRINOLOGY | Age: 30
End: 2020-10-28
Payer: COMMERCIAL

## 2020-10-28 PROCEDURE — 96372 THER/PROPH/DIAG INJ SC/IM: CPT | Performed by: INTERNAL MEDICINE

## 2020-10-28 RX ORDER — TESTOSTERONE CYPIONATE 200 MG/ML
200 INJECTION INTRAMUSCULAR ONCE
Status: COMPLETED | OUTPATIENT
Start: 2020-10-28 | End: 2020-10-28

## 2020-10-28 RX ADMIN — TESTOSTERONE CYPIONATE 200 MG: 200 INJECTION INTRAMUSCULAR at 14:32

## 2020-11-11 ENCOUNTER — NURSE ONLY (OUTPATIENT)
Dept: ENDOCRINOLOGY | Age: 30
End: 2020-11-11
Payer: COMMERCIAL

## 2020-11-11 ENCOUNTER — HOSPITAL ENCOUNTER (OUTPATIENT)
Dept: ORTHOPEDIC SURGERY | Age: 30
Discharge: HOME | End: 2020-11-13
Payer: COMMERCIAL

## 2020-11-11 ENCOUNTER — OFFICE VISIT (OUTPATIENT)
Dept: ORTHOPEDIC SURGERY | Age: 30
End: 2020-11-11
Payer: COMMERCIAL

## 2020-11-11 VITALS
OXYGEN SATURATION: 99 % | HEIGHT: 70 IN | BODY MASS INDEX: 42.8 KG/M2 | HEART RATE: 82 BPM | WEIGHT: 299 LBS | TEMPERATURE: 97.7 F

## 2020-11-11 PROBLEM — M77.11 LATERAL EPICONDYLITIS, RIGHT ELBOW: Status: ACTIVE | Noted: 2020-11-11

## 2020-11-11 PROCEDURE — 1036F TOBACCO NON-USER: CPT | Performed by: ORTHOPAEDIC SURGERY

## 2020-11-11 PROCEDURE — G8484 FLU IMMUNIZE NO ADMIN: HCPCS | Performed by: ORTHOPAEDIC SURGERY

## 2020-11-11 PROCEDURE — G8427 DOCREV CUR MEDS BY ELIG CLIN: HCPCS | Performed by: ORTHOPAEDIC SURGERY

## 2020-11-11 PROCEDURE — G8417 CALC BMI ABV UP PARAM F/U: HCPCS | Performed by: ORTHOPAEDIC SURGERY

## 2020-11-11 PROCEDURE — 96372 THER/PROPH/DIAG INJ SC/IM: CPT | Performed by: INTERNAL MEDICINE

## 2020-11-11 PROCEDURE — 73080 X-RAY EXAM OF ELBOW: CPT

## 2020-11-11 PROCEDURE — 73080 X-RAY EXAM OF ELBOW: CPT | Performed by: ORTHOPAEDIC SURGERY

## 2020-11-11 PROCEDURE — 99203 OFFICE O/P NEW LOW 30 MIN: CPT | Performed by: ORTHOPAEDIC SURGERY

## 2020-11-11 RX ORDER — METHYLPREDNISOLONE 4 MG/1
TABLET ORAL
Qty: 1 KIT | Refills: 0 | Status: SHIPPED | OUTPATIENT
Start: 2020-11-11 | End: 2021-07-19 | Stop reason: ALTCHOICE

## 2020-11-11 RX ORDER — MELOXICAM 15 MG/1
15 TABLET ORAL DAILY
Qty: 60 TABLET | Refills: 1 | Status: SHIPPED | OUTPATIENT
Start: 2020-11-11 | End: 2021-07-19 | Stop reason: ALTCHOICE

## 2020-11-11 RX ORDER — TESTOSTERONE CYPIONATE 200 MG/ML
200 INJECTION INTRAMUSCULAR ONCE
Status: COMPLETED | OUTPATIENT
Start: 2020-11-11 | End: 2020-11-11

## 2020-11-11 RX ADMIN — TESTOSTERONE CYPIONATE 200 MG: 200 INJECTION INTRAMUSCULAR at 14:37

## 2020-11-11 ASSESSMENT — ENCOUNTER SYMPTOMS
SHORTNESS OF BREATH: 0
SORE THROAT: 0
ABDOMINAL PAIN: 0

## 2020-11-11 NOTE — PROGRESS NOTES
Narrative    Not on file     History reviewed. No pertinent family history. No Known Allergies  Current Outpatient Medications on File Prior to Visit   Medication Sig Dispense Refill    azelastine (OPTIVAR) 0.05 % ophthalmic solution Use 1 Drop in both eyes twice daily as needed.  fexofenadine (ALLEGRA) 60 MG tablet Take 60 mg by mouth      triamcinolone (NASACORT) 55 MCG/ACT nasal inhaler 2 sprays      testosterone cypionate (DEPOTESTOTERONE CYPIONATE) 200 MG/ML injection Inject 1mL Q 2 weeks (Patient taking differently: Indications: order per OV note 8/5/20 Inject 1mL Q 2 weeks) 10 mL 1    triamcinolone (KENALOG) 0.025 % cream Apply topically 2 times daily. (Patient not taking: Reported on 11/11/2020) 1 Tube 0    SYRINGE-NEEDLE, DISP, 3 ML (B-D INTEGRA SYRINGE) 22G X 1-1/2\" 3 ML MISC 1 each by Does not apply route daily (Patient not taking: Reported on 11/11/2020) 20 each 6    montelukast (SINGULAIR) 10 MG tablet Take 10 mg by mouth       No current facility-administered medications on file prior to visit. Review of Systems   Constitutional: Negative for fever. HENT: Negative for sore throat. Eyes: Negative for visual disturbance. Respiratory: Negative for shortness of breath. Cardiovascular: Negative for chest pain. Gastrointestinal: Negative for abdominal pain. Genitourinary: Negative for difficulty urinating. Skin: Negative for rash. Neurological: Negative for headaches. Hematological: Does not bruise/bleed easily. Objective:   Pulse 82   Temp 97.7 °F (36.5 °C) (Temporal)   Ht 5' 10\" (1.778 m)   Wt 299 lb (135.6 kg)   SpO2 99%   BMI 42.90 kg/m²     Ortho Exam  Is an oriented white male in the no significant distress. Right elbow he has full flexion extension with difficulty when he extends there is pain the lateral aspect of the elbow. Pain on the lateral epicondyle. No pain on the medial condyle. No pain olecranon.   Full range of motion MCP, PIP, DIP of all digits of the right hand. Normal sensation light touch. +2 radial pulses noted. Pain with resistive extension of the wrist over the lateral condyle region. No pain resisted flexion of the wrist over the medial condyle. Pain to resistive supination again over the lateral epicondyle. Radiographs and Laboratory Studies:     Diagnostic Imaging Studies:    Xr Elbow Right (min 3 Views)    Result Date: 11/11/2020  AP lateral and oblique x-rays of the right elbow we obtained to evaluate the source of pain shows good alignment. No signs of any arthritis. No dislocation is noted. Laboratory Studies:   Lab Results   Component Value Date    WBC 7.4 01/27/2020    HGB 15.7 01/27/2020    HCT 46.9 01/27/2020    MCV 87.6 01/27/2020     01/27/2020     No results found for: SEDRATE  No results found for: CRP    Assessment:      Diagnosis Orders   1. Elbow pain, right  XR ELBOW RIGHT (MIN 3 VIEWS)   2. Lateral epicondylitis, right elbow            Plan:     Plan at this time I discussed the problem with the patient. We will go ahead and place him in a lateral condyle splint. We will place him in a Medrol Dosepak followed with meloxicam 15 mg every day. We instructed him in activities to do and activities to avoid. Handout was given to the patient concerning the lateral condyle-itis. We will see him back for follow-up in 4 weeks. Orders Placed This Encounter   Procedures    XR ELBOW RIGHT (MIN 3 VIEWS)     Standing Status:   Future     Number of Occurrences:   1     Standing Expiration Date:   11/11/2021     Scheduling Instructions:      AP, lateral, oblique of right elbow     Order Specific Question:   Reason for exam:     Answer:   elbow pain     Orders Placed This Encounter   Medications    methylPREDNISolone (MEDROL, DAXA,) 4 MG tablet     Sig: Take by mouth.      Dispense:  1 kit     Refill:  0    meloxicam (MOBIC) 15 MG tablet     Sig: Take 1 tablet by mouth daily     Dispense:  60 tablet Refill:  1       No follow-ups on file.       Sonam Jacob MD

## 2020-11-11 NOTE — PATIENT INSTRUCTIONS
Patient Education        Tennis Elbow: Care Instructions  Your Care Instructions     Tennis elbow is soreness or pain on the outer part of the elbow. The pain occurs when the tendon is stretched and becomes irritated by repeated twisting of the hand, wrist, and forearm. A tendon is a tough tissue that connects muscle to bone. This injury is common in tennis players. But you also can get it from many activities that work the same muscles. Examples include gardening, painting, and using tools. Tennis elbow usually heals with rest and treatment at home. Follow-up care is a key part of your treatment and safety. Be sure to make and go to all appointments, and call your doctor if you are having problems. It's also a good idea to know your test results and keep a list of the medicines you take. How can you care for yourself at home?    · Rest your fingers, wrist, and forearm. Try to stop or reduce any activity that causes elbow pain. You may have to rest your arm for weeks to months. Follow your doctor's directions for how long to rest.     · Put ice or a cold pack on your elbow for 10 to 20 minutes at a time. Try to do this every 1 to 2 hours for the next 3 days (when you are awake) or until the swelling goes down. Put a thin cloth between the ice and your skin. You can try heat, or alternating heat and ice, after the first 3 days.     · If your doctor gave you a brace or splint, use it as directed. A \"counterforce\" brace is a strap around your forearm, just below your elbow. It may ease the pressure on the tendon and spread force throughout your arm.     · Prop up your elbow on pillows to help reduce swelling.     · Follow your doctor's or physical therapist's directions for exercise.     · Return to your usual activities slowly.     · Try to prevent the problem. Learn the best techniques for your sport. For example, make sure the  on your tennis racquet is not too big for your hand.  Try not to hit a tennis ball late in your swing.     · Think about asking your employer about new ways of doing your job if your elbow pain is caused by something you do at work. Medicines    · Be safe with medicines. Read and follow all instructions on the label. ? If the doctor gave you a prescription medicine for pain, take it as prescribed. ? If you are not taking a prescription pain medicine, ask your doctor if you can take an over-the-counter medicine. When should you call for help? Call your doctor now or seek immediate medical care if:    · Your pain is worse.     · You cannot bend your elbow normally.     · Your arm or hand is cool or pale or changes color.     · You have tingling, weakness, or numbness in your hand and fingers. Watch closely for changes in your health, and be sure to contact your doctor if:    · You have work problems caused by your elbow pain.     · Your pain is not better after 2 weeks. Where can you learn more? Go to https://Deenty.Friendsee. org and sign in to your Ge[employer]ob[employer] account. Enter [phone] in the Reply.io box to learn more about \"Tennis Elbow: Care Instructions. \"     If you do not have an account, please click on the \"Sign Up Now\" link. Current as of: March 2, 2020               Content Version: 12.6  © 7219-9133 [employer]. Care instructions adapted under license by [facility] ([facility]). If you have questions about a medical condition or this instruction, always ask your healthcare professional. [place] [address] any warranty or liability for your use of this information.

## 2020-11-11 NOTE — PROGRESS NOTES
Patient given Testosterone 200mg IM right gluteal..  Will return in 2 weeks for next injection    Patient tolerated injection well.     Administrations This Visit     testosterone cypionate (DEPOTESTOTERONE CYPIONATE) injection 200 mg     Admin Date  11/11/2020 Action  Given Dose  200 mg Route  Intramuscular Administered By  Tati Sin LPN

## 2020-11-12 ENCOUNTER — NURSE ONLY (OUTPATIENT)
Dept: ORTHOPEDIC SURGERY | Age: 30
End: 2020-11-12

## 2020-11-12 PROCEDURE — MISCD86 TENNIS ELBOW STRAP-BREG: Performed by: ORTHOPAEDIC SURGERY

## 2020-11-25 ENCOUNTER — NURSE ONLY (OUTPATIENT)
Dept: ENDOCRINOLOGY | Age: 30
End: 2020-11-25
Payer: COMMERCIAL

## 2020-11-25 PROCEDURE — 96372 THER/PROPH/DIAG INJ SC/IM: CPT | Performed by: INTERNAL MEDICINE

## 2020-11-25 RX ORDER — TESTOSTERONE CYPIONATE 200 MG/ML
200 INJECTION INTRAMUSCULAR ONCE
Status: COMPLETED | OUTPATIENT
Start: 2020-11-25 | End: 2020-11-25

## 2020-11-25 RX ADMIN — TESTOSTERONE CYPIONATE 200 MG: 200 INJECTION INTRAMUSCULAR at 15:10

## 2020-11-25 NOTE — PROGRESS NOTES
Patient given Testosterone 200mg IM left gluteal..  Will return in 2 weeks for next injection    Patient tolerated injection well.     Administrations This Visit     testosterone cypionate (DEPOTESTOTERONE CYPIONATE) injection 200 mg     Admin Date  11/25/2020 Action  Given Dose  200 mg Route  Intramuscular Administered By  Gadiel Frankel LPN

## 2020-12-09 ENCOUNTER — NURSE ONLY (OUTPATIENT)
Dept: ENDOCRINOLOGY | Age: 30
End: 2020-12-09
Payer: COMMERCIAL

## 2020-12-09 PROCEDURE — 96372 THER/PROPH/DIAG INJ SC/IM: CPT | Performed by: INTERNAL MEDICINE

## 2020-12-09 RX ORDER — TESTOSTERONE CYPIONATE 200 MG/ML
200 INJECTION INTRAMUSCULAR ONCE
Status: COMPLETED | OUTPATIENT
Start: 2020-12-09 | End: 2020-12-09

## 2020-12-09 RX ADMIN — TESTOSTERONE CYPIONATE 200 MG: 200 INJECTION INTRAMUSCULAR at 14:47

## 2020-12-09 NOTE — PROGRESS NOTES
Patient given Testosterone 200mg IM right gluteal..  Will return in 2 weeks for next injection    Patient tolerated injection well.     Administrations This Visit     testosterone cypionate (DEPOTESTOTERONE CYPIONATE) injection 200 mg     Admin Date  12/09/2020 Action  Given Dose  200 mg Route  Intramuscular Administered By  Lisseth Smith LPN

## 2020-12-16 ENCOUNTER — OFFICE VISIT (OUTPATIENT)
Dept: ORTHOPEDIC SURGERY | Age: 30
End: 2020-12-16
Payer: COMMERCIAL

## 2020-12-16 VITALS
BODY MASS INDEX: 42.8 KG/M2 | WEIGHT: 299 LBS | TEMPERATURE: 97.1 F | HEIGHT: 70 IN | HEART RATE: 87 BPM | OXYGEN SATURATION: 99 %

## 2020-12-16 PROCEDURE — 1036F TOBACCO NON-USER: CPT | Performed by: ORTHOPAEDIC SURGERY

## 2020-12-16 PROCEDURE — 99213 OFFICE O/P EST LOW 20 MIN: CPT | Performed by: ORTHOPAEDIC SURGERY

## 2020-12-16 PROCEDURE — G8484 FLU IMMUNIZE NO ADMIN: HCPCS | Performed by: ORTHOPAEDIC SURGERY

## 2020-12-16 PROCEDURE — G8427 DOCREV CUR MEDS BY ELIG CLIN: HCPCS | Performed by: ORTHOPAEDIC SURGERY

## 2020-12-16 PROCEDURE — G8417 CALC BMI ABV UP PARAM F/U: HCPCS | Performed by: ORTHOPAEDIC SURGERY

## 2020-12-16 NOTE — PROGRESS NOTES
Subjective:      Patient ID: Bev Ren is a 26 y.o. male who presents today for:  Chief Complaint   Patient presents with    Follow-up     4 week f/u right elbow, pt states elbow is feeling better and pain is very minimal.       HPI  Doing very well at the present time. No complaints of any pain. Has been back to regular activities. History reviewed. No pertinent past medical history. History reviewed. No pertinent surgical history. Social History     Socioeconomic History    Marital status:      Spouse name: Not on file    Number of children: Not on file    Years of education: Not on file    Highest education level: Not on file   Occupational History    Not on file   Social Needs    Financial resource strain: Not on file    Food insecurity     Worry: Not on file     Inability: Not on file    Transportation needs     Medical: Not on file     Non-medical: Not on file   Tobacco Use    Smoking status: Never Smoker    Smokeless tobacco: Never Used   Substance and Sexual Activity    Alcohol use: Yes     Comment: socially    Drug use: Never    Sexual activity: Not on file   Lifestyle    Physical activity     Days per week: Not on file     Minutes per session: Not on file    Stress: Not on file   Relationships    Social connections     Talks on phone: Not on file     Gets together: Not on file     Attends Jainism service: Not on file     Active member of club or organization: Not on file     Attends meetings of clubs or organizations: Not on file     Relationship status: Not on file    Intimate partner violence     Fear of current or ex partner: Not on file     Emotionally abused: Not on file     Physically abused: Not on file     Forced sexual activity: Not on file   Other Topics Concern    Not on file   Social History Narrative    Not on file     History reviewed. No pertinent family history.   No Known Allergies  Current Outpatient Medications on File Prior to Visit Medication Sig Dispense Refill    methylPREDNISolone (MEDROL, DAXA,) 4 MG tablet Take by mouth. 1 kit 0    azelastine (OPTIVAR) 0.05 % ophthalmic solution Use 1 Drop in both eyes twice daily as needed.  fexofenadine (ALLEGRA) 60 MG tablet Take 60 mg by mouth      montelukast (SINGULAIR) 10 MG tablet Take 10 mg by mouth      triamcinolone (NASACORT) 55 MCG/ACT nasal inhaler 2 sprays      meloxicam (MOBIC) 15 MG tablet Take 1 tablet by mouth daily 60 tablet 1    testosterone cypionate (DEPOTESTOTERONE CYPIONATE) 200 MG/ML injection Inject 1mL Q 2 weeks (Patient taking differently: Indications: order per OV note 8/5/20 Inject 1mL Q 2 weeks) 10 mL 1    triamcinolone (KENALOG) 0.025 % cream Apply topically 2 times daily. (Patient not taking: Reported on 11/11/2020) 1 Tube 0    SYRINGE-NEEDLE, DISP, 3 ML (B-D INTEGRA SYRINGE) 22G X 1-1/2\" 3 ML MISC 1 each by Does not apply route daily (Patient not taking: Reported on 11/11/2020) 20 each 6     No current facility-administered medications on file prior to visit. Review of Systems  No change from prior visit    Objective:   Pulse 87   Temp 97.1 °F (36.2 °C) (Temporal)   Ht 5' 10\" (1.778 m)   Wt 299 lb (135.6 kg)   SpO2 99%   BMI 42.90 kg/m²     Ortho Exam  Upper extremity full range of motion MCP PIP and DIP of all digits of the hand. Full flexion-extension of the wrist.  Full pronation supination of the arm. No pain on the lateral condyle, medial epicondyle, and olecranon. No pain or assistive extension of the wrist over the lateral condyle. Radiographs and Laboratory Studies:     Diagnostic Imaging Studies:    As per prior visit    Laboratory Studies:   Lab Results   Component Value Date    WBC 7.4 01/27/2020    HGB 15.7 01/27/2020    HCT 46.9 01/27/2020    MCV 87.6 01/27/2020     01/27/2020     No results found for: SEDRATE  No results found for: CRP    Assessment:      Diagnosis Orders   1.  Lateral epicondylitis, right elbow Plan:     At this point we will go ahead and return to back regular therapies. We again discussed do's and don'ts with respect to lifting. Use lateral epicondyle if he starts to get any recurrence of pain along with anti-inflammatories and we will see him back in apparent basis    No orders of the defined types were placed in this encounter. No orders of the defined types were placed in this encounter. No follow-ups on file.       Deanne Meier MD

## 2020-12-23 ENCOUNTER — NURSE ONLY (OUTPATIENT)
Dept: ENDOCRINOLOGY | Age: 30
End: 2020-12-23
Payer: COMMERCIAL

## 2020-12-23 PROCEDURE — 96372 THER/PROPH/DIAG INJ SC/IM: CPT | Performed by: INTERNAL MEDICINE

## 2020-12-23 RX ORDER — TESTOSTERONE CYPIONATE 200 MG/ML
200 INJECTION INTRAMUSCULAR ONCE
Status: COMPLETED | OUTPATIENT
Start: 2020-12-23 | End: 2020-12-23

## 2020-12-23 RX ADMIN — TESTOSTERONE CYPIONATE 200 MG: 200 INJECTION INTRAMUSCULAR at 14:51

## 2020-12-23 NOTE — PROGRESS NOTES
Patient given Testosterone 200mg IM left gluteal..  Will return in 2 weeks for next injection    Patient tolerated injection well.     Administrations This Visit     testosterone cypionate (DEPOTESTOTERONE CYPIONATE) injection 200 mg     Admin Date  12/23/2020 Action  Given Dose  200 mg Route  Intramuscular Administered By  Pily Guzman LPN

## 2021-01-20 ENCOUNTER — NURSE ONLY (OUTPATIENT)
Dept: ENDOCRINOLOGY | Age: 31
End: 2021-01-20
Payer: COMMERCIAL

## 2021-01-20 DIAGNOSIS — E29.1 HYPOGONADISM MALE: Primary | ICD-10-CM

## 2021-01-20 PROCEDURE — 96372 THER/PROPH/DIAG INJ SC/IM: CPT | Performed by: INTERNAL MEDICINE

## 2021-01-20 RX ORDER — TESTOSTERONE CYPIONATE 200 MG/ML
200 INJECTION INTRAMUSCULAR ONCE
Status: COMPLETED | OUTPATIENT
Start: 2021-01-20 | End: 2021-01-20

## 2021-01-20 RX ADMIN — TESTOSTERONE CYPIONATE 200 MG: 200 INJECTION INTRAMUSCULAR at 14:29

## 2021-01-20 NOTE — PROGRESS NOTES
Patient given Testosterone 200mg IM right gluteal..  Will return in 2 weeks for next injection    Patient tolerated injection well.     Administrations This Visit     testosterone cypionate (DEPOTESTOTERONE CYPIONATE) injection 200 mg     Admin Date  01/20/2021 Action  Given Dose  200 mg Route  Intramuscular Administered By  Beatrice Harris LPN

## 2021-01-21 NOTE — PROGRESS NOTES
Patient given Testosterone 200mg IM left gluteal..  Will return in 2 weeks for next injection    Patient tolerated injection well.     Administrations This Visit     testosterone cypionate (DEPOTESTOTERONE CYPIONATE) injection 200 mg     Admin Date  10/28/2020 Action  Given Dose  200 mg Route  Intramuscular Administered By  Gerald Urena LPN
[place]

## 2021-02-03 ENCOUNTER — NURSE ONLY (OUTPATIENT)
Dept: ENDOCRINOLOGY | Age: 31
End: 2021-02-03
Payer: COMMERCIAL

## 2021-02-03 DIAGNOSIS — E29.1 HYPOGONADISM MALE: Primary | ICD-10-CM

## 2021-02-03 PROCEDURE — 96372 THER/PROPH/DIAG INJ SC/IM: CPT | Performed by: INTERNAL MEDICINE

## 2021-02-03 RX ORDER — TESTOSTERONE CYPIONATE 200 MG/ML
200 INJECTION INTRAMUSCULAR ONCE
Status: COMPLETED | OUTPATIENT
Start: 2021-02-03 | End: 2021-02-03

## 2021-02-03 RX ADMIN — TESTOSTERONE CYPIONATE 200 MG: 200 INJECTION INTRAMUSCULAR at 15:48

## 2021-02-03 NOTE — PROGRESS NOTES
Patient given Testosterone 200mg IM left gluteal..  Will return in 2 weeks for next injection    Patient tolerated injection well.     Administrations This Visit     testosterone cypionate (DEPOTESTOTERONE CYPIONATE) injection 200 mg     Admin Date  02/03/2021 Action  Given Dose  200 mg Route  Intramuscular Administered By  Estefani Sahu LPN

## 2021-02-11 DIAGNOSIS — E29.1 HYPOGONADISM IN MALE: ICD-10-CM

## 2021-02-11 LAB
ANION GAP SERPL CALCULATED.3IONS-SCNC: 11 MEQ/L (ref 9–15)
BUN BLDV-MCNC: 13 MG/DL (ref 6–20)
CALCIUM SERPL-MCNC: 9 MG/DL (ref 8.5–9.9)
CHLORIDE BLD-SCNC: 106 MEQ/L (ref 95–107)
CO2: 26 MEQ/L (ref 20–31)
CREAT SERPL-MCNC: 0.88 MG/DL (ref 0.7–1.2)
GFR AFRICAN AMERICAN: >60
GFR NON-AFRICAN AMERICAN: >60
GLUCOSE BLD-MCNC: 84 MG/DL (ref 70–99)
HCT VFR BLD CALC: 45.9 % (ref 42–52)
HEMOGLOBIN: 15.2 G/DL (ref 14–18)
MCH RBC QN AUTO: 29.7 PG (ref 27–31.3)
MCHC RBC AUTO-ENTMCNC: 33.1 % (ref 33–37)
MCV RBC AUTO: 89.5 FL (ref 80–100)
PDW BLD-RTO: 14 % (ref 11.5–14.5)
PLATELET # BLD: 246 K/UL (ref 130–400)
POTASSIUM SERPL-SCNC: 3.7 MEQ/L (ref 3.4–4.9)
RBC # BLD: 5.12 M/UL (ref 4.7–6.1)
SODIUM BLD-SCNC: 143 MEQ/L (ref 135–144)
WBC # BLD: 8.5 K/UL (ref 4.8–10.8)

## 2021-02-12 LAB
SEX HORMONE BINDING GLOBULIN: 7 NMOL/L (ref 11–80)
TESTOSTERONE FREE PERCENT: 3.1 % (ref 1.6–2.9)
TESTOSTERONE FREE, CALC: 126 PG/ML (ref 47–244)
TESTOSTERONE TOTAL-MALE: 414 NG/DL (ref 300–1080)

## 2021-02-17 ENCOUNTER — NURSE ONLY (OUTPATIENT)
Dept: ENDOCRINOLOGY | Age: 31
End: 2021-02-17
Payer: COMMERCIAL

## 2021-02-17 ENCOUNTER — OFFICE VISIT (OUTPATIENT)
Dept: ENDOCRINOLOGY | Age: 31
End: 2021-02-17
Payer: COMMERCIAL

## 2021-02-17 VITALS
WEIGHT: 302 LBS | HEART RATE: 51 BPM | BODY MASS INDEX: 43.23 KG/M2 | DIASTOLIC BLOOD PRESSURE: 74 MMHG | OXYGEN SATURATION: 98 % | HEIGHT: 70 IN | SYSTOLIC BLOOD PRESSURE: 119 MMHG

## 2021-02-17 DIAGNOSIS — E29.1 HYPOGONADISM IN MALE: Primary | ICD-10-CM

## 2021-02-17 PROCEDURE — G8484 FLU IMMUNIZE NO ADMIN: HCPCS | Performed by: INTERNAL MEDICINE

## 2021-02-17 PROCEDURE — G8427 DOCREV CUR MEDS BY ELIG CLIN: HCPCS | Performed by: INTERNAL MEDICINE

## 2021-02-17 PROCEDURE — G8417 CALC BMI ABV UP PARAM F/U: HCPCS | Performed by: INTERNAL MEDICINE

## 2021-02-17 PROCEDURE — 96372 THER/PROPH/DIAG INJ SC/IM: CPT | Performed by: INTERNAL MEDICINE

## 2021-02-17 PROCEDURE — 99213 OFFICE O/P EST LOW 20 MIN: CPT | Performed by: INTERNAL MEDICINE

## 2021-02-17 PROCEDURE — 1036F TOBACCO NON-USER: CPT | Performed by: INTERNAL MEDICINE

## 2021-02-17 RX ORDER — TESTOSTERONE CYPIONATE 200 MG/ML
200 INJECTION INTRAMUSCULAR ONCE
Status: COMPLETED | OUTPATIENT
Start: 2021-02-17 | End: 2021-02-17

## 2021-02-17 RX ADMIN — TESTOSTERONE CYPIONATE 200 MG: 200 INJECTION INTRAMUSCULAR at 15:13

## 2021-02-17 NOTE — PROGRESS NOTES
Patient given Testosterone 200mg IM right gluteal..  Will return in 2 weeks for next injection    Patient tolerated injection well.     Administrations This Visit     testosterone cypionate (DEPOTESTOTERONE CYPIONATE) injection 200 mg     Admin Date  02/17/2021 Action  Given Dose  200 mg Route  Intramuscular Administered By  Ashu Melendez LPN

## 2021-02-17 NOTE — PROGRESS NOTES
Patient Active Problem List   Diagnosis    Hypogonadism in male    Morbid obesity ([facility] [address]    Seasonal allergies    Lateral epicondylitis, right elbow     No Known Allergies      Current Outpatient Medications:     methylPREDNISolone (MEDROL, DAXA,) 4 MG tablet, Take by mouth., Disp: 1 kit, Rfl: 0    triamcinolone (KENALOG) 0.025 % cream, Apply topically 2 times daily. , Disp: 1 Tube, Rfl: 0    SYRINGE-NEEDLE, DISP, 3 ML (B-D INTEGRA SYRINGE) 22G X 1-1/2\" 3 ML MISC, 1 each by Does not apply route daily, Disp: 20 each, Rfl: 6    azelastine (OPTIVAR) 0.05 % ophthalmic solution, Use 1 Drop in both eyes twice daily as needed. , Disp: , Rfl:     fexofenadine (ALLEGRA) 60 MG tablet, Take 60 mg by mouth, Disp: , Rfl:     montelukast (SINGULAIR) 10 MG tablet, Take 10 mg by mouth, Disp: , Rfl:     triamcinolone (NASACORT) 55 MCG/ACT nasal inhaler, 2 sprays, Disp: , Rfl:     meloxicam (MOBIC) 15 MG tablet, Take 1 tablet by mouth daily, Disp: 60 tablet, Rfl: 1    testosterone cypionate (DEPOTESTOTERONE CYPIONATE) 200 MG/ML injection, Inject 1mL Q 2 weeks (Patient taking differently: Indications: order per OV note 8/5/20 Inject 1mL Q 2 weeks), Disp: 10 mL, Rfl: 1    Review of Systems    Vitals:    02/17/21 1503   BP: 119/74   Pulse: 51   SpO2: 98%   Weight: (!) 302 lb (137 kg)   Height: 5' 10\" (1.778 m)       Objective:   Physical Exam  Vitals signs reviewed. Constitutional:       Appearance: Normal appearance. He is obese. HENT:      Head: Normocephalic and atraumatic. Right Ear: External ear normal.      Left Ear: External ear normal.      Nose: Nose normal.   Neck:      Musculoskeletal: Normal range of motion and neck supple. Cardiovascular:      Rate and Rhythm: Bradycardia present. Pulmonary:      Effort: Pulmonary effort is normal.   Musculoskeletal: Normal range of motion. Neurological:      General: No focal deficit present. Mental Status: He is alert and oriented to person, place, and time. Psychiatric:         Mood and Affect: Mood normal.         Assessment:       Diagnosis Orders   1.  Hypogonadism in male  Testosterone Free and Total Male           Plan:      Orders Placed This Encounter   Procedures    Testosterone Free and Total Male     Standing Status:   Future     Standing Expiration Date:   2/17/2022     Continue testosterone 200 mg every 2 weeks follow-up in 6 months          Abhilash Escobedo MD

## 2021-03-03 ENCOUNTER — NURSE ONLY (OUTPATIENT)
Dept: FAMILY MEDICINE CLINIC | Age: 31
End: 2021-03-03
Payer: COMMERCIAL

## 2021-03-03 DIAGNOSIS — E29.1 HYPOGONADISM IN MALE: Primary | ICD-10-CM

## 2021-03-03 PROCEDURE — 96372 THER/PROPH/DIAG INJ SC/IM: CPT | Performed by: FAMILY MEDICINE

## 2021-03-03 RX ORDER — TESTOSTERONE CYPIONATE 200 MG/ML
200 INJECTION INTRAMUSCULAR ONCE
Status: COMPLETED | OUTPATIENT
Start: 2021-03-03 | End: 2021-03-03

## 2021-03-03 RX ADMIN — TESTOSTERONE CYPIONATE 200 MG: 200 INJECTION INTRAMUSCULAR at 11:48

## 2021-03-03 NOTE — PROGRESS NOTES
Patient given Testosterone 200mg IM left gluteal..  Will return in 2 weeks for next injection    Patient tolerated injection well.     Administrations This Visit     testosterone cypionate (DEPOTESTOTERONE CYPIONATE) injection 200 mg     Admin Date  03/03/2021 Action  Given Dose  200 mg Route  Intramuscular Administered By  Nemesio Kraft LPN

## 2021-03-09 ENCOUNTER — TELEPHONE (OUTPATIENT)
Dept: ENDOCRINOLOGY | Age: 31
End: 2021-03-09

## 2021-03-17 ENCOUNTER — NURSE ONLY (OUTPATIENT)
Dept: ENDOCRINOLOGY | Age: 31
End: 2021-03-17
Payer: COMMERCIAL

## 2021-03-17 DIAGNOSIS — E29.1 HYPOGONADISM IN MALE: Primary | ICD-10-CM

## 2021-03-17 PROCEDURE — 96372 THER/PROPH/DIAG INJ SC/IM: CPT | Performed by: INTERNAL MEDICINE

## 2021-03-17 RX ORDER — TESTOSTERONE CYPIONATE 200 MG/ML
200 INJECTION INTRAMUSCULAR ONCE
Status: COMPLETED | OUTPATIENT
Start: 2021-03-17 | End: 2021-03-17

## 2021-03-17 RX ADMIN — TESTOSTERONE CYPIONATE 200 MG: 200 INJECTION INTRAMUSCULAR at 15:06

## 2021-03-17 NOTE — PROGRESS NOTES
Patient given Testosterone 200mg IM right gluteal..  Will return in 2 weeks for next injection    Patient tolerated injection well.     Administrations This Visit     testosterone cypionate (DEPOTESTOTERONE CYPIONATE) injection 200 mg     Admin Date  03/17/2021 Action  Given Dose  200 mg Route  Intramuscular Administered By  Nemesio Kraft LPN

## 2021-03-31 ENCOUNTER — NURSE ONLY (OUTPATIENT)
Dept: ENDOCRINOLOGY | Age: 31
End: 2021-03-31
Payer: COMMERCIAL

## 2021-03-31 DIAGNOSIS — E29.1 HYPOGONADISM IN MALE: Primary | ICD-10-CM

## 2021-03-31 PROCEDURE — 96372 THER/PROPH/DIAG INJ SC/IM: CPT | Performed by: INTERNAL MEDICINE

## 2021-03-31 RX ORDER — TESTOSTERONE CYPIONATE 200 MG/ML
200 INJECTION INTRAMUSCULAR ONCE
Status: COMPLETED | OUTPATIENT
Start: 2021-03-31 | End: 2021-03-31

## 2021-03-31 RX ADMIN — TESTOSTERONE CYPIONATE 200 MG: 200 INJECTION INTRAMUSCULAR at 14:35

## 2021-03-31 NOTE — PROGRESS NOTES
Patient given Testosterone 200mg IM lef gluteal..  Will return in 2 weeks for next injection    Patient tolerated injection well.     Administrations This Visit     testosterone cypionate (DEPOTESTOTERONE CYPIONATE) injection 200 mg     Admin Date  03/31/2021 Action  Given Dose  200 mg Route  Intramuscular Administered By  Nemesio Kraft LPN

## 2021-04-14 ENCOUNTER — NURSE ONLY (OUTPATIENT)
Dept: ENDOCRINOLOGY | Age: 31
End: 2021-04-14
Payer: COMMERCIAL

## 2021-04-14 DIAGNOSIS — E29.1 HYPOGONADISM IN MALE: Primary | ICD-10-CM

## 2021-04-14 PROCEDURE — 96372 THER/PROPH/DIAG INJ SC/IM: CPT | Performed by: PHYSICIAN ASSISTANT

## 2021-04-14 RX ORDER — TESTOSTERONE CYPIONATE 200 MG/ML
200 INJECTION INTRAMUSCULAR ONCE
Status: COMPLETED | OUTPATIENT
Start: 2021-04-14 | End: 2021-04-14

## 2021-04-14 RX ADMIN — TESTOSTERONE CYPIONATE 200 MG: 200 INJECTION INTRAMUSCULAR at 15:08

## 2021-04-14 NOTE — PROGRESS NOTES
Patient given Testosterone 200mg IM right gluteal..  Will return in 2 weeks for next injection    Patient tolerated injection well.     Administrations This Visit     testosterone cypionate (DEPOTESTOTERONE CYPIONATE) injection 200 mg     Admin Date  04/14/2021 Action  Given Dose  200 mg Route  Intramuscular Administered By  Nancie Epstein LPN

## 2021-04-28 ENCOUNTER — NURSE ONLY (OUTPATIENT)
Dept: ENDOCRINOLOGY | Age: 31
End: 2021-04-28
Payer: COMMERCIAL

## 2021-04-28 DIAGNOSIS — E29.1 HYPOGONADISM IN MALE: Primary | ICD-10-CM

## 2021-04-28 PROCEDURE — 96372 THER/PROPH/DIAG INJ SC/IM: CPT | Performed by: INTERNAL MEDICINE

## 2021-04-28 RX ORDER — TESTOSTERONE CYPIONATE 200 MG/ML
200 INJECTION INTRAMUSCULAR ONCE
Status: COMPLETED | OUTPATIENT
Start: 2021-04-28 | End: 2021-04-28

## 2021-04-28 RX ADMIN — TESTOSTERONE CYPIONATE 200 MG: 200 INJECTION INTRAMUSCULAR at 14:53

## 2021-04-28 NOTE — PROGRESS NOTES
Patient given Testosterone 200mg IM left gluteal..  Will return in 2 weeks for next injection    Patient tolerated injection well.     Administrations This Visit     testosterone cypionate (DEPOTESTOTERONE CYPIONATE) injection 200 mg     Admin Date  04/28/2021 Action  Given Dose  200 mg Route  Intramuscular Administered By  Yissel Barkley LPN

## 2021-05-11 ENCOUNTER — NURSE ONLY (OUTPATIENT)
Dept: FAMILY MEDICINE CLINIC | Age: 31
End: 2021-05-11
Payer: COMMERCIAL

## 2021-05-11 DIAGNOSIS — E29.1 HYPOGONADISM IN MALE: Primary | ICD-10-CM

## 2021-05-11 PROCEDURE — 96372 THER/PROPH/DIAG INJ SC/IM: CPT | Performed by: INTERNAL MEDICINE

## 2021-05-11 RX ORDER — TESTOSTERONE CYPIONATE 200 MG/ML
200 INJECTION INTRAMUSCULAR ONCE
Status: COMPLETED | OUTPATIENT
Start: 2021-05-11 | End: 2021-05-11

## 2021-05-11 RX ADMIN — TESTOSTERONE CYPIONATE 200 MG: 200 INJECTION INTRAMUSCULAR at 14:10

## 2021-05-11 NOTE — PROGRESS NOTES
Patient given Testosterone 200mg IM right gluteal..  Will return in 2 weeks for next injection    Patient tolerated injection well.     Administrations This Visit     testosterone cypionate (DEPOTESTOTERONE CYPIONATE) injection 200 mg     Admin Date  05/11/2021 Action  Given Dose  200 mg Route  Intramuscular Administered By  Yissel Barkley LPN

## 2021-06-23 ENCOUNTER — NURSE ONLY (OUTPATIENT)
Dept: ENDOCRINOLOGY | Age: 31
End: 2021-06-23

## 2021-06-23 DIAGNOSIS — E29.1 HYPOGONADISM IN MALE: Primary | ICD-10-CM

## 2021-06-23 PROCEDURE — 96372 THER/PROPH/DIAG INJ SC/IM: CPT | Performed by: INTERNAL MEDICINE

## 2021-06-23 RX ORDER — TESTOSTERONE CYPIONATE 200 MG/ML
200 INJECTION INTRAMUSCULAR ONCE
Status: COMPLETED | OUTPATIENT
Start: 2021-06-23 | End: 2021-06-23

## 2021-06-23 RX ADMIN — TESTOSTERONE CYPIONATE 200 MG: 200 INJECTION INTRAMUSCULAR at 14:42

## 2021-06-23 NOTE — PROGRESS NOTES
Patient given Testosterone 200mg IM right gluteal..  Will return in 2 weeks for next injection    Patient tolerated injection well.     Administrations This Visit     testosterone cypionate (DEPOTESTOTERONE CYPIONATE) injection 200 mg     Admin Date  06/23/2021 Action  Given Dose  200 mg Route  Intramuscular Administered By  Veronica Castro LPN

## 2021-07-07 ENCOUNTER — NURSE ONLY (OUTPATIENT)
Dept: ENDOCRINOLOGY | Age: 31
End: 2021-07-07

## 2021-07-07 DIAGNOSIS — E29.1 HYPOGONADISM IN MALE: Primary | ICD-10-CM

## 2021-07-07 PROCEDURE — 96372 THER/PROPH/DIAG INJ SC/IM: CPT | Performed by: INTERNAL MEDICINE

## 2021-07-07 RX ORDER — TESTOSTERONE CYPIONATE 200 MG/ML
200 INJECTION INTRAMUSCULAR ONCE
Status: COMPLETED | OUTPATIENT
Start: 2021-07-07 | End: 2021-07-07

## 2021-07-07 RX ADMIN — TESTOSTERONE CYPIONATE 200 MG: 200 INJECTION INTRAMUSCULAR at 15:05

## 2021-07-07 NOTE — PROGRESS NOTES
Patient given Testosterone 200mg IM left gluteal..  Will return in 2 weeks for next injection    Patient tolerated injection well.     Administrations This Visit     testosterone cypionate (DEPOTESTOTERONE CYPIONATE) injection 200 mg     Admin Date  07/07/2021 Action  Given Dose  200 mg Route  Intramuscular Administered By  Dany Gomes LPN

## 2021-07-19 ENCOUNTER — OFFICE VISIT (OUTPATIENT)
Dept: FAMILY MEDICINE CLINIC | Age: 31
End: 2021-07-19
Payer: COMMERCIAL

## 2021-07-19 VITALS
OXYGEN SATURATION: 98 % | HEART RATE: 76 BPM | SYSTOLIC BLOOD PRESSURE: 112 MMHG | BODY MASS INDEX: 43.55 KG/M2 | RESPIRATION RATE: 12 BRPM | HEIGHT: 69 IN | TEMPERATURE: 97.8 F | DIASTOLIC BLOOD PRESSURE: 76 MMHG | WEIGHT: 294 LBS

## 2021-07-19 DIAGNOSIS — R07.89 CHEST TIGHTNESS: ICD-10-CM

## 2021-07-19 DIAGNOSIS — R07.89 CHEST TIGHTNESS: Primary | ICD-10-CM

## 2021-07-19 DIAGNOSIS — R20.2 PARESTHESIAS IN LEFT HAND: ICD-10-CM

## 2021-07-19 LAB
ALBUMIN SERPL-MCNC: 4.7 G/DL (ref 3.5–4.6)
ALP BLD-CCNC: 66 U/L (ref 35–104)
ALT SERPL-CCNC: 25 U/L (ref 0–41)
ANION GAP SERPL CALCULATED.3IONS-SCNC: 10 MEQ/L (ref 9–15)
AST SERPL-CCNC: 22 U/L (ref 0–40)
BASOPHILS ABSOLUTE: 0.1 K/UL (ref 0–0.2)
BASOPHILS RELATIVE PERCENT: 0.8 %
BILIRUB SERPL-MCNC: 0.4 MG/DL (ref 0.2–0.7)
BUN BLDV-MCNC: 11 MG/DL (ref 6–20)
CALCIUM SERPL-MCNC: 9.5 MG/DL (ref 8.5–9.9)
CHLORIDE BLD-SCNC: 106 MEQ/L (ref 95–107)
CHOLESTEROL, FASTING: 179 MG/DL (ref 0–199)
CO2: 24 MEQ/L (ref 20–31)
CREAT SERPL-MCNC: 0.89 MG/DL (ref 0.7–1.2)
EOSINOPHILS ABSOLUTE: 0.3 K/UL (ref 0–0.7)
EOSINOPHILS RELATIVE PERCENT: 3.4 %
FOLATE: 13.5 NG/ML (ref 7.3–26.1)
GFR AFRICAN AMERICAN: >60
GFR NON-AFRICAN AMERICAN: >60
GLOBULIN: 2.5 G/DL (ref 2.3–3.5)
GLUCOSE BLD-MCNC: 88 MG/DL (ref 70–99)
HBA1C MFR BLD: 5.6 % (ref 4.8–5.9)
HCT VFR BLD CALC: 45.6 % (ref 42–52)
HDLC SERPL-MCNC: 29 MG/DL (ref 40–59)
HEMOGLOBIN: 15.4 G/DL (ref 14–18)
LDL CHOLESTEROL CALCULATED: 94 MG/DL (ref 0–129)
LYMPHOCYTES ABSOLUTE: 2.4 K/UL (ref 1–4.8)
LYMPHOCYTES RELATIVE PERCENT: 32.1 %
MCH RBC QN AUTO: 29.5 PG (ref 27–31.3)
MCHC RBC AUTO-ENTMCNC: 33.7 % (ref 33–37)
MCV RBC AUTO: 87.6 FL (ref 80–100)
MONOCYTES ABSOLUTE: 0.7 K/UL (ref 0.2–0.8)
MONOCYTES RELATIVE PERCENT: 9 %
NEUTROPHILS ABSOLUTE: 4.1 K/UL (ref 1.4–6.5)
NEUTROPHILS RELATIVE PERCENT: 54.7 %
PDW BLD-RTO: 13.9 % (ref 11.5–14.5)
PLATELET # BLD: 255 K/UL (ref 130–400)
POTASSIUM SERPL-SCNC: 4.3 MEQ/L (ref 3.4–4.9)
RBC # BLD: 5.21 M/UL (ref 4.7–6.1)
SODIUM BLD-SCNC: 140 MEQ/L (ref 135–144)
TOTAL PROTEIN: 7.2 G/DL (ref 6.3–8)
TRIGLYCERIDE, FASTING: 278 MG/DL (ref 0–150)
TSH REFLEX: 1.44 UIU/ML (ref 0.44–3.86)
VITAMIN B-12: 584 PG/ML (ref 232–1245)
WBC # BLD: 7.6 K/UL (ref 4.8–10.8)

## 2021-07-19 PROCEDURE — 99204 OFFICE O/P NEW MOD 45 MIN: CPT | Performed by: INTERNAL MEDICINE

## 2021-07-19 PROCEDURE — 1036F TOBACCO NON-USER: CPT | Performed by: INTERNAL MEDICINE

## 2021-07-19 PROCEDURE — G8417 CALC BMI ABV UP PARAM F/U: HCPCS | Performed by: INTERNAL MEDICINE

## 2021-07-19 PROCEDURE — G8427 DOCREV CUR MEDS BY ELIG CLIN: HCPCS | Performed by: INTERNAL MEDICINE

## 2021-07-19 PROCEDURE — 93000 ELECTROCARDIOGRAM COMPLETE: CPT | Performed by: INTERNAL MEDICINE

## 2021-07-19 SDOH — ECONOMIC STABILITY: FOOD INSECURITY: WITHIN THE PAST 12 MONTHS, THE FOOD YOU BOUGHT JUST DIDN'T LAST AND YOU DIDN'T HAVE MONEY TO GET MORE.: NEVER TRUE

## 2021-07-19 SDOH — ECONOMIC STABILITY: FOOD INSECURITY: WITHIN THE PAST 12 MONTHS, YOU WORRIED THAT YOUR FOOD WOULD RUN OUT BEFORE YOU GOT MONEY TO BUY MORE.: NEVER TRUE

## 2021-07-19 ASSESSMENT — PATIENT HEALTH QUESTIONNAIRE - PHQ9
SUM OF ALL RESPONSES TO PHQ9 QUESTIONS 1 & 2: 0
SUM OF ALL RESPONSES TO PHQ QUESTIONS 1-9: 0
2. FEELING DOWN, DEPRESSED OR HOPELESS: 0
1. LITTLE INTEREST OR PLEASURE IN DOING THINGS: 0
SUM OF ALL RESPONSES TO PHQ QUESTIONS 1-9: 0
SUM OF ALL RESPONSES TO PHQ QUESTIONS 1-9: 0

## 2021-07-19 ASSESSMENT — ENCOUNTER SYMPTOMS
ABDOMINAL PAIN: 0
EYE PAIN: 0
SHORTNESS OF BREATH: 0
BACK PAIN: 0

## 2021-07-19 ASSESSMENT — SOCIAL DETERMINANTS OF HEALTH (SDOH): HOW HARD IS IT FOR YOU TO PAY FOR THE VERY BASICS LIKE FOOD, HOUSING, MEDICAL CARE, AND HEATING?: NOT HARD AT ALL

## 2021-07-19 NOTE — PROGRESS NOTES
Subjective:      Patient ID: Lona Ang is a 33 y.o. male who presents today with:  Chief Complaint   Patient presents with    Numbness     left arm hit or miss but getting worse       HPI    Most of the time while sitting  Pain down left arm  When really bed  Gets left sided chest tightness  Also gets tingling in left hand. Not worse with exertion  NO Breathing problems with this  Been going on for about 30 days  No fevers, no chills  No trauma, no falls. No fh of cad. Past Medical History:   Diagnosis Date    Hypogonadism in male      Past Surgical History:   Procedure Laterality Date    VASECTOMY  2020    [facility]     Social History     Socioeconomic History    Marital status:      Spouse name: Not on file    Number of children: Not on file    Years of education: Not on file    Highest education level: Not on file   Occupational History    Not on file   Tobacco Use    Smoking status: Never Smoker    Smokeless tobacco: Never Used   Vaping Use    Vaping Use: Never assessed   Substance and Sexual Activity    Alcohol use: Yes     Comment: socially    Drug use: Never    Sexual activity: Not on file   Other Topics Concern    Not on file   Social History Narrative    Not on file     Social Determinants of Health     Financial Resource Strain: Low Risk     Difficulty of Paying Living Expenses: Not hard at all   Food Insecurity: No Food Insecurity    Worried About [address] in the Last Year: Never true    [address] in the Last Year: Never true   Transportation Needs:     Lack of Transportation (Medical):      Lack of Transportation (Non-Medical):    Physical Activity:     Days of Exercise per Week:     Minutes of Exercise per Session:    Stress:     Feeling of Stress :    Social Connections:     Frequency of Communication with Friends and Family:     Frequency of Social Gatherings with Friends and Family:     Attends [employer] Services:     Active Member of Clubs or Organizations:     Attends Club or Organization Meetings:     Marital Status:    Intimate Partner Violence:     Fear of Current or Ex-Partner:     Emotionally Abused:     Physically Abused:     Sexually Abused:      No Known Allergies  Current Outpatient Medications on File Prior to Visit   Medication Sig Dispense Refill    triamcinolone (KENALOG) 0.025 % cream Apply topically 2 times daily. 1 Tube 0    SYRINGE-NEEDLE, DISP, 3 ML (B-D INTEGRA SYRINGE) 22G X 1-1/2\" 3 ML MISC 1 each by Does not apply route daily 20 each 6    fexofenadine (ALLEGRA) 60 MG tablet Take 60 mg by mouth      testosterone cypionate (DEPOTESTOTERONE CYPIONATE) 200 MG/ML injection Inject 1mL Q 2 weeks (Patient taking differently: Indications: order per OV note 8/5/20 Inject 1mL Q 2 weeks) 10 mL 1     No current facility-administered medications on file prior to visit. I have personally reviewed the ROS, PMH, PFH, and social history     Review of Systems   Constitutional: Negative for chills. HENT: Negative for congestion. Eyes: Negative for pain. Respiratory: Negative for shortness of breath. Cardiovascular: Negative for chest pain. Gastrointestinal: Negative for abdominal pain. Genitourinary: Negative for hematuria. Musculoskeletal: Negative for back pain. Allergic/Immunologic: Negative for immunocompromised state. Neurological: Negative for headaches. Psychiatric/Behavioral: Negative for hallucinations. Objective:   /76   Pulse 76   Temp 97.8 °F (36.6 °C)   Resp 12   Ht 5' 9\" (1.753 m)   Wt 294 lb (133.4 kg)   SpO2 98%   BMI 43.42 kg/m²     Physical Exam  Constitutional:       General: He is not in acute distress. Appearance: Normal appearance. He is not ill-appearing, toxic-appearing or diaphoretic. HENT:      Head: Normocephalic. Neck:      Vascular: No carotid bruit. Cardiovascular:      Rate and Rhythm: Normal rate and regular rhythm. Pulses: Normal pulses.       Heart sounds: Normal heart sounds. No murmur heard. No friction rub. No gallop. Pulmonary:      Effort: Pulmonary effort is normal. No respiratory distress. Breath sounds: Normal breath sounds. No wheezing, rhonchi or rales. Abdominal:      General: Abdomen is flat. There is no distension. Palpations: Abdomen is soft. Tenderness: There is no abdominal tenderness. There is no right CVA tenderness, left CVA tenderness, guarding or rebound. Musculoskeletal:      Cervical back: Neck supple. Right lower leg: No edema. Left lower leg: No edema. Skin:     General: Skin is warm. Findings: No erythema or rash. Neurological:      Mental Status: He is alert. Psychiatric:         Mood and Affect: Mood normal.           Assessment:       Diagnosis Orders   1. Chest tightness  TSH with Reflex    Lipid, Fasting    EMG    EKG 12 lead    XR CHEST (2 VW)    CBC Auto Differential    Comprehensive Metabolic Panel    Hemoglobin A1C    Vitamin B12 & Folate    CARDIAC STRESS TEST EXERCISE ONLY    EMG   2. Paresthesias in left hand  TSH with Reflex    Lipid, Fasting    EMG    EKG 12 lead    XR CHEST (2 VW)    CBC Auto Differential    Comprehensive Metabolic Panel    Hemoglobin A1C    Vitamin B12 & Folate    CARDIAC STRESS TEST EXERCISE ONLY    EMG         Plan:     vc  Stress test, see order, emg  Follow up with depend on results  6 week tentative.            Orders Placed This Encounter   Procedures    XR CHEST (2 VW)     Standing Status:   Future     Standing Expiration Date:   7/19/2022    TSH with Reflex     Standing Status:   Future     Standing Expiration Date:   7/19/2022    Lipid, Fasting     Standing Status:   Future     Standing Expiration Date:   7/19/2022    CBC Auto Differential     Standing Status:   Future     Standing Expiration Date:   7/19/2022    Comprehensive Metabolic Panel     Standing Status:   Future     Standing Expiration Date:   7/19/2022    Hemoglobin A1C     Standing Status:   Future     Standing Expiration Date:   7/19/2022    Vitamin B12 & Folate     Standing Status:   Future     Standing Expiration Date:   7/19/2022    CARDIAC STRESS TEST EXERCISE ONLY     Standing Status:   Future     Standing Expiration Date:   7/19/2022     Order Specific Question:   Reason for Exam?     Answer: Other    EKG 12 lead     Standing Status:   Future     Standing Expiration Date:   9/17/2021     Order Specific Question:   Reason for Exam?     Answer: Other    EMG     Standing Status:   Future     Standing Expiration Date:   9/17/2021     Order Specific Question:   Which body part? Answer:   left upper extremity    EMG     Standing Status:   Future     Standing Expiration Date:   9/17/2021     Order Specific Question:   Which body part? Answer:   left upper extremity     No orders of the defined types were placed in this encounter. if you get or new or worsening symptoms call 911. Risk of mi explained. If anything should change or worsen call ASAP, don't wait for next scheduled appointment. No follow-ups on file.       Temo Espino MD

## 2021-07-21 ENCOUNTER — NURSE ONLY (OUTPATIENT)
Dept: ENDOCRINOLOGY | Age: 31
End: 2021-07-21
Payer: COMMERCIAL

## 2021-07-21 DIAGNOSIS — E29.1 HYPOGONADISM IN MALE: Primary | ICD-10-CM

## 2021-07-21 PROCEDURE — 96372 THER/PROPH/DIAG INJ SC/IM: CPT | Performed by: INTERNAL MEDICINE

## 2021-07-21 RX ORDER — TESTOSTERONE CYPIONATE 200 MG/ML
200 INJECTION INTRAMUSCULAR ONCE
Status: COMPLETED | OUTPATIENT
Start: 2021-07-21 | End: 2021-07-21

## 2021-07-21 RX ADMIN — TESTOSTERONE CYPIONATE 200 MG: 200 INJECTION INTRAMUSCULAR at 14:47

## 2021-07-21 NOTE — PROGRESS NOTES
Patient given Testosterone 200mg IM right gluteal..  Will return in 2 weeks for next injection    Patient tolerated injection well.     Administrations This Visit     testosterone cypionate (DEPOTESTOTERONE CYPIONATE) injection 200 mg     Admin Date  07/21/2021 Action  Given Dose  200 mg Route  Intramuscular Administered By  Pily Guzman LPN

## 2021-07-29 ENCOUNTER — HOSPITAL ENCOUNTER (OUTPATIENT)
Dept: NON INVASIVE DIAGNOSTICS | Age: 31
Discharge: HOME | End: 2021-07-29
Payer: COMMERCIAL

## 2021-07-29 DIAGNOSIS — R20.2 PARESTHESIAS IN LEFT HAND: ICD-10-CM

## 2021-07-29 DIAGNOSIS — R07.89 CHEST TIGHTNESS: ICD-10-CM

## 2021-07-29 PROCEDURE — 93017 CV STRESS TEST TRACING ONLY: CPT

## 2021-07-29 NOTE — PROGRESS NOTES
Hx,allergies and medications reviewed. Procedure explained and informed consent obtained. Tolerated treadmill well for 9:22 minutes. Reached 87 % target HR. SOB noted. Returned to baseline in recovery. Denies chest pain or pressure. EKG shows no noted ectopy.

## 2021-07-29 NOTE — PROCEDURES
[address]                      [address] [place], [address] [facility]                              CARDIAC STRESS TEST    PATIENT NAME: Marita Cruz                     :        1990  MED REC NO:   [id number]                            ROOM:  ACCOUNT NO:   [de-identified]                           ADMIT DATE: 2021  PROVIDER:     Charlie Dickens MD    CARDIOVASCULAR DIAGNOSTIC DEPARTMENT    DATE OF STUDY:  2021    GRADED EXERCISE STRESS TEST    INDICATION OF THE PROCEDURE:  Chest tightness and paresthesias to left  hand. Underlying electrocardiogram is sinus rhythm, heart rate is 82 beats per  minute, blood pressure 121/78. The patient was then stressed according to standard Koby protocol. Total exercise time is 9 minutes and 22 seconds obtaining a peak heart  rate of 164 beats per minute with a peak blood pressure of 152/70. This represents 87% of age maximum predicted heart rate giving him a  functional capacity of 11.3 METs. The patient had no complaints of angina. The patient remained in sinus rhythm throughout with no ST-segment  changes nor arrhythmias. IMPRESSION:  1. Normal graded exercise stress test with no exercise-induced ischemia  nor arrhythmias. 2.  Average functional capacity for age.         Carolina Mcdonald MD    D: 2021 #18:34:25       T: 2021 18:43:20     [email or web address]  Job#: [id number]     Doc#: [id number]    CC:

## 2021-08-11 ENCOUNTER — NURSE ONLY (OUTPATIENT)
Dept: ENDOCRINOLOGY | Age: 31
End: 2021-08-11
Payer: COMMERCIAL

## 2021-08-11 DIAGNOSIS — E29.1 HYPOGONADISM IN MALE: Primary | ICD-10-CM

## 2021-08-11 PROCEDURE — 96372 THER/PROPH/DIAG INJ SC/IM: CPT | Performed by: INTERNAL MEDICINE

## 2021-08-11 RX ORDER — TESTOSTERONE CYPIONATE 200 MG/ML
200 INJECTION INTRAMUSCULAR ONCE
Status: COMPLETED | OUTPATIENT
Start: 2021-08-11 | End: 2021-08-11

## 2021-08-11 RX ADMIN — TESTOSTERONE CYPIONATE 200 MG: 200 INJECTION INTRAMUSCULAR at 14:39

## 2021-08-11 NOTE — PROGRESS NOTES
Patient given Testosterone 200mg IM left gluteal..  Will return in 2 weeks for next injection  Needs to recheck level in 1 week  Patient tolerated injection well.     Administrations This Visit     testosterone cypionate (DEPOTESTOTERONE CYPIONATE) injection 200 mg     Admin Date  08/11/2021 Action  Given Dose  200 mg Route  Intramuscular Administered By  Caprice Jackson LPN

## 2021-08-18 ENCOUNTER — TELEPHONE (OUTPATIENT)
Dept: FAMILY MEDICINE CLINIC | Age: 31
End: 2021-08-18

## 2021-08-18 NOTE — TELEPHONE ENCOUNTER
----- Message from [address] sent at 8/17/2021  9:06 AM EDT -----  Subject: Appointment Request    Reason for Call: Urgent (Patient Request) Existing Condition Follow    QUESTIONS  Type of Appointment? Established Patient  Reason for appointment request? No appointments available during search  Additional Information for Provider? pt is wanting to reschedule his 4   month follow up pt cannot come in until next week   ---------------------------------------------------------------------------  --------------  CALL BACK INFO  What is the best way for the office to contact you? OK to leave message on   voicemail  Preferred Call Back Phone Number? [phone]  ---------------------------------------------------------------------------  --------------  SCRIPT ANSWERS  Relationship to Patient? Self  Have your symptoms changed? No  (Is the patient requesting to be seen urgently for their symptoms?)? Yes  Is this follow up request related to routine Diabetes Management? Yes  Are you having any new concerns about your existing condition? No  Have you been diagnosed with, awaiting test results for, or told that you   are suspected of having COVID-19 (Coronavirus)? (If patient has tested   negative or was tested as a requirement for work, school, or travel and   not based on symptoms, answer no)? No  Do you currently have flu-like symptoms including fever or chills, cough,   shortness of breath, difficulty breathing, or new loss of taste or smell? No  Have you had close contact with someone with COVID-19 in the last 14 days? No  (Service Expert  click yes below to proceed with [email or web address] As Usual   Scheduling)?  Yes

## 2021-08-25 ENCOUNTER — HOSPITAL ENCOUNTER (OUTPATIENT)
Dept: NEUROLOGY | Age: 31
Discharge: HOME | End: 2021-08-25
Payer: COMMERCIAL

## 2021-08-25 ENCOUNTER — NURSE ONLY (OUTPATIENT)
Dept: ENDOCRINOLOGY | Age: 31
End: 2021-08-25
Payer: COMMERCIAL

## 2021-08-25 DIAGNOSIS — R07.89 CHEST TIGHTNESS: ICD-10-CM

## 2021-08-25 DIAGNOSIS — E29.1 HYPOGONADISM IN MALE: Primary | ICD-10-CM

## 2021-08-25 DIAGNOSIS — R20.2 PARESTHESIAS IN LEFT HAND: ICD-10-CM

## 2021-08-25 PROCEDURE — 96372 THER/PROPH/DIAG INJ SC/IM: CPT | Performed by: INTERNAL MEDICINE

## 2021-08-25 PROCEDURE — 95913 NRV CNDJ TEST 13/> STUDIES: CPT | Performed by: PSYCHIATRY & NEUROLOGY

## 2021-08-25 PROCEDURE — 95886 MUSC TEST DONE W/N TEST COMP: CPT

## 2021-08-25 PROCEDURE — 95911 NRV CNDJ TEST 9-10 STUDIES: CPT

## 2021-08-25 PROCEDURE — 95886 MUSC TEST DONE W/N TEST COMP: CPT | Performed by: PSYCHIATRY & NEUROLOGY

## 2021-08-25 RX ORDER — TESTOSTERONE CYPIONATE 200 MG/ML
200 INJECTION INTRAMUSCULAR ONCE
Status: COMPLETED | OUTPATIENT
Start: 2021-08-25 | End: 2021-08-25

## 2021-08-25 RX ADMIN — TESTOSTERONE CYPIONATE 200 MG: 200 INJECTION INTRAMUSCULAR at 14:37

## 2021-08-25 NOTE — PROGRESS NOTES
Patient given Testosterone 200mg IM right gluteal..  Will return in 2 weeks for next injection    Patient tolerated injection well.     Administrations This Visit     testosterone cypionate (DEPOTESTOTERONE CYPIONATE) injection 200 mg     Admin Date  08/25/2021 Action  Given Dose  200 mg Route  Intramuscular Administered By  Jessika Kiser LPN

## 2021-08-25 NOTE — PROCEDURES
[address]                      [facility], [address] [facility]                             ELECTROMYOGRAM REPORT    PATIENT NAME: Herrera Hussein                     :        1990  MED REC NO:   [id number]                            ROOM:  ACCOUNT NO:   [de-identified]                           ADMIT DATE: 2021  PROVIDER:     Blanca Schneider MD    DATE OF EM2021    Neurophysiological studies are requested for the patient's numbness in  the left arm when sitting. This appears to be in the proximal area  going down his arm. This does not occur when he is lying down    Motor nerve conduction study of the right median nerve shows normal  amplitudes, latencies, and conduction velocity. Motor nerve conduction  study of the left median nerve shows normal amplitudes, latencies, and  conduction velocity. Motor nerve conduction study of the right ulnar  nerve shows normal amplitudes, latencies, and conduction velocity. Motor nerve conduction study of the left ulnar nerve shows normal  amplitudes, latencies, and conduction velocity. F-wave responses are  normal.  Sensory nerve conduction study of the right median nerve  recorded in digit 2 shows minimally prolonged latencies, normal  amplitudes, and decreased conduction velocity with mid palm stimulation  showing mildly prolonged latencies, normal amplitudes, and decreased  conduction velocity. The left median digital examination shows  minimally prolonged latencies, low amplitudes, and normal conduction  velocity. The left median mid palm stimulation shows prolonged  latencies, normal amplitudes, and decreased conduction velocity. The  right ulnar digital examination shows normal amplitudes, latencies, and  conduction velocity. The left ulnar digital examination shows normal  amplitudes, latencies, and conduction velocity.   The right ulnar mixed  orthodromic study shows normal amplitudes, latencies, and conduction  velocity. _____ mid palm stimulation shows normal peak latencies, low  amplitudes, and normal conduction velocity. Radial sensory nerve  conduction studies are normal.  Needle electrode examination of the  above-sampled muscles shows mild decreased units in the left first  dorsal interosseous muscle, abductor pollicis brevis muscle, and  abductor digiti minimi muscle. There is no active denervation. IMPRESSION:  1. Mild bilateral median nerve neuropathies at the wrists suggesting  carpal tunnel syndrome. This may be asymptomatic. 2.  There is evidence of C8-T1 mild radicular changes notable on the  left and this may suggest a thoracic outlet syndrome or C8-T1  radiculopathy. An MRI of the cervical spine and brachial plexus is  recommended. Multiple sensory nerve conduction studies are evaluated to avoid an  artifact or temperature differences. This test is personally performed  and interpreted by me. Thank you Dr. Jamar Gomez for allowing us to participate in the care of the  patient.     With kind regards,        Nelson Hickey MD    D: 08/25/2021 11:20:53       T: 08/25/2021 11:24:20     [id number]  Job#: [id number]     Doc#: [id number]    CC:

## 2021-09-02 ENCOUNTER — OFFICE VISIT (OUTPATIENT)
Dept: FAMILY MEDICINE CLINIC | Age: 31
End: 2021-09-02
Payer: COMMERCIAL

## 2021-09-02 VITALS
TEMPERATURE: 98.5 F | HEART RATE: 82 BPM | WEIGHT: 295 LBS | SYSTOLIC BLOOD PRESSURE: 121 MMHG | RESPIRATION RATE: 15 BRPM | OXYGEN SATURATION: 97 % | DIASTOLIC BLOOD PRESSURE: 76 MMHG | HEIGHT: 69 IN | BODY MASS INDEX: 43.69 KG/M2

## 2021-09-02 DIAGNOSIS — R94.131 ABNORMAL EMG: Primary | ICD-10-CM

## 2021-09-02 PROCEDURE — G8427 DOCREV CUR MEDS BY ELIG CLIN: HCPCS | Performed by: INTERNAL MEDICINE

## 2021-09-02 PROCEDURE — 99214 OFFICE O/P EST MOD 30 MIN: CPT | Performed by: INTERNAL MEDICINE

## 2021-09-02 PROCEDURE — G8417 CALC BMI ABV UP PARAM F/U: HCPCS | Performed by: INTERNAL MEDICINE

## 2021-09-02 PROCEDURE — 1036F TOBACCO NON-USER: CPT | Performed by: INTERNAL MEDICINE

## 2021-09-02 ASSESSMENT — ENCOUNTER SYMPTOMS
BACK PAIN: 0
SHORTNESS OF BREATH: 0
EYE PAIN: 0
ABDOMINAL PAIN: 0

## 2021-09-02 NOTE — PROGRESS NOTES
Subjective:      Patient ID: Cyndy Castleman is a 33 y.o. male who presents today with:  Chief Complaint   Patient presents with    Follow-up    Discuss Labs    Flu Vaccine     declined at this visit        HPI     SX are stable  Completed tested here for follow up. Past Medical History:   Diagnosis Date    Hypogonadism in male      Past Surgical History:   Procedure Laterality Date    [facility]  2020    [facility]     Social History     Socioeconomic History    Marital status:      Spouse name: Not on file    Number of children: Not on file    Years of education: Not on file    Highest education level: Not on file   Occupational History    Not on file   Tobacco Use    Smoking status: Never Smoker    Smokeless tobacco: Never Used   Vaping Use    Vaping Use: Never assessed   Substance and Sexual Activity    Alcohol use: Yes     Comment: socially    Drug use: Never    Sexual activity: Not on file   Other Topics Concern    Not on file   Social History Narrative    Not on file     Social Determinants of Health     Financial Resource Strain: Low Risk     Difficulty of Paying Living Expenses: Not hard at all   Food Insecurity: No Food Insecurity    Worried About [address] in the Last Year: Never true    [address] [employer] in the Last Year: Never true   Transportation Needs:     Lack of Transportation (Medical):      Lack of Transportation (Non-Medical):    Physical Activity:     Days of Exercise per Week:     Minutes of Exercise per Session:    Stress:     Feeling of Stress :    Social Connections:     Frequency of Communication with Friends and Family:     Frequency of Social Gatherings with Friends and Family:     Attends Tenriism Services:     Active Member of Clubs or Organizations:     Attends Club or Organization Meetings:     Marital Status:    Intimate Partner Violence:     Fear of Current or Ex-Partner:     Emotionally Abused:     Physically Abused:     Sexually Abused:      No Known Allergies  Current Outpatient Medications on File Prior to Visit   Medication Sig Dispense Refill    triamcinolone (KENALOG) 0.025 % cream Apply topically 2 times daily. 1 Tube 0    SYRINGE-NEEDLE, DISP, 3 ML (B-D INTEGRA SYRINGE) 22G X 1-1/2\" 3 ML MISC 1 each by Does not apply route daily 20 each 6    fexofenadine (ALLEGRA) 60 MG tablet Take 60 mg by mouth      testosterone cypionate (DEPOTESTOTERONE CYPIONATE) 200 MG/ML injection Inject 1mL Q 2 weeks (Patient taking differently: Indications: order per OV note 8/5/20 Inject 1mL Q 2 weeks) 10 mL 1     No current facility-administered medications on file prior to visit. I have personally reviewed the ROS, PMH, PFH, and social history     Review of Systems   Constitutional: Negative for chills. HENT: Negative for congestion. Eyes: Negative for pain. Respiratory: Negative for shortness of breath. Cardiovascular: Negative for chest pain. Gastrointestinal: Negative for abdominal pain. Genitourinary: Negative for hematuria. Musculoskeletal: Negative for back pain. Allergic/Immunologic: Negative for immunocompromised state. Neurological: Negative for headaches. Psychiatric/Behavioral: Negative for hallucinations. Objective:   /76 (Site: Right Upper Arm, Position: Sitting, Cuff Size: Large Adult)   Pulse 82   Temp 98.5 °F (36.9 °C) (Tympanic)   Resp 15   Ht 5' 9\" (1.753 m)   Wt 295 lb (133.8 kg)   SpO2 97%   BMI 43.56 kg/m²     Physical Exam  Constitutional:       General: He is not in acute distress. Appearance: Normal appearance. He is not ill-appearing, toxic-appearing or diaphoretic. HENT:      Head: Normocephalic. Neck:      Vascular: No carotid bruit. Cardiovascular:      Rate and Rhythm: Normal rate and regular rhythm. Pulses: Normal pulses. Heart sounds: Normal heart sounds. No murmur heard. No friction rub. No gallop.     Pulmonary:      Effort: Pulmonary effort is normal. No respiratory distress. Breath sounds: Normal breath sounds. No wheezing, rhonchi or rales. Abdominal:      General: Abdomen is flat. There is no distension. Palpations: Abdomen is soft. Tenderness: There is no abdominal tenderness. There is no right CVA tenderness, left CVA tenderness, guarding or rebound. Musculoskeletal:      Cervical back: Neck supple. Right lower leg: No edema. Left lower leg: No edema. Skin:     General: Skin is warm. Findings: No erythema or rash. Neurological:      Mental Status: He is alert. Psychiatric:         Mood and Affect: Mood normal.           Assessment:       Diagnosis Orders   1. Abnormal EMG  MRI CERVICAL SPINE W WO CONTRAST         Plan:     vc  High tg-Wt loss/exercise  MRI c spine  TOS vs cervical radiculopathy. F/u after testing. If anything worsens call me. Orders Placed This Encounter   Procedures    MRI CERVICAL SPINE W WO CONTRAST     AND brachial plexus     Standing Status:   Future     Standing Expiration Date:   9/2/2022     No orders of the defined types were placed in this encounter. he'll do flu shot 2021   If anything should change or worsen call ASAP, don't wait for next scheduled appointment. No follow-ups on file.       Tara Yeager MD

## 2021-09-08 ENCOUNTER — OFFICE VISIT (OUTPATIENT)
Dept: ENDOCRINOLOGY | Age: 31
End: 2021-09-08
Payer: COMMERCIAL

## 2021-09-08 ENCOUNTER — NURSE ONLY (OUTPATIENT)
Dept: ENDOCRINOLOGY | Age: 31
End: 2021-09-08
Payer: COMMERCIAL

## 2021-09-08 VITALS
WEIGHT: 297 LBS | SYSTOLIC BLOOD PRESSURE: 118 MMHG | HEIGHT: 70 IN | DIASTOLIC BLOOD PRESSURE: 82 MMHG | OXYGEN SATURATION: 95 % | BODY MASS INDEX: 42.52 KG/M2 | HEART RATE: 84 BPM

## 2021-09-08 DIAGNOSIS — E29.1 HYPOGONADISM IN MALE: Primary | ICD-10-CM

## 2021-09-08 DIAGNOSIS — E29.1 HYPOGONADISM MALE: ICD-10-CM

## 2021-09-08 PROCEDURE — G8417 CALC BMI ABV UP PARAM F/U: HCPCS | Performed by: INTERNAL MEDICINE

## 2021-09-08 PROCEDURE — 99213 OFFICE O/P EST LOW 20 MIN: CPT | Performed by: INTERNAL MEDICINE

## 2021-09-08 PROCEDURE — G8427 DOCREV CUR MEDS BY ELIG CLIN: HCPCS | Performed by: INTERNAL MEDICINE

## 2021-09-08 PROCEDURE — 1036F TOBACCO NON-USER: CPT | Performed by: INTERNAL MEDICINE

## 2021-09-08 PROCEDURE — 96372 THER/PROPH/DIAG INJ SC/IM: CPT | Performed by: INTERNAL MEDICINE

## 2021-09-08 RX ORDER — TESTOSTERONE CYPIONATE 200 MG/ML
INJECTION INTRAMUSCULAR
Qty: 10 ML | Refills: 1
Start: 2021-09-08 | End: 2022-10-05 | Stop reason: SDUPTHER

## 2021-09-08 RX ORDER — TESTOSTERONE CYPIONATE 200 MG/ML
200 INJECTION INTRAMUSCULAR ONCE
Status: COMPLETED | OUTPATIENT
Start: 2021-09-08 | End: 2021-09-08

## 2021-09-08 RX ADMIN — TESTOSTERONE CYPIONATE 200 MG: 200 INJECTION INTRAMUSCULAR at 13:40

## 2021-09-08 NOTE — PROGRESS NOTES
9/8/2021    Assessment:       Diagnosis Orders   1. Hypogonadism in male  Testosterone Free and Total Male         PLAN:     Orders Placed This Encounter   Procedures    Testosterone Free and Total Male     Standing Status:   Future     Standing Expiration Date:   9/8/2022    CBC     Standing Status:   Future     Standing Expiration Date:   9/8/2022     Continue testosterone 200 mg every 2 weeks follow-up in 6 months time  Orders Placed This Encounter   Medications    testosterone cypionate (DEPOTESTOTERONE CYPIONATE) 200 MG/ML injection     Sig: Inject 1mL Q 2 weeks     Dispense:  10 mL     Refill:  1         Subjective:     Chief Complaint   Patient presents with    Hypogonadism     Vitals:    09/08/21 1339   BP: 118/82   Pulse: 84   SpO2: 95%   Weight: 297 lb (134.7 kg)   Height: 5' 10\" (1.778 m)     Wt Readings from Last 3 Encounters:   09/08/21 297 lb (134.7 kg)   09/02/21 295 lb (133.8 kg)   07/19/21 294 lb (133.4 kg)     BP Readings from Last 3 Encounters:   09/08/21 118/82   09/02/21 121/76   07/19/21 112/76     Follow-up on hypogonadism patient on testosterone injections through the testosterone clinic levels have been stable    Other  This is a recurrent problem. The current episode started more than 1 year ago. The problem occurs intermittently. The problem has been gradually improving. Associated symptoms include fatigue. Exacerbated by: Obesity. Treatments tried: Intramuscular testosterone. The treatment provided moderate relief. Results for So Martinez (MRN [id number]) as of 9/8/2021 13:55   Ref. Range 7/24/2019 15:01 1/27/2020 11:20 7/30/2020 11:03 2/11/2021 10:22 9/1/2021 14:07   Total Testosterone Latest Ref Range: 300 - 1080 ng/dL 548 673 406 414 490       Results for So Martinez (MRN [id number]) as of 9/8/2021 13:55   Ref.  Range 9/1/2021 14:07   Sex Hormone Binding Latest Ref Range: 11 - 80 nmol/L 8 (L)   Testosterone Free, Calc Latest Ref Range: 47 - 244 pg/mL 148   Total Testosterone Latest Ref Range: 300 - 1080 ng/dL 490   Testosterone % Free Latest Ref Range: 1.6 - 2.9 % 3.0 (H)       Past Medical History:   Diagnosis Date    Hypogonadism in male      Past Surgical History:   Procedure Laterality Date    [facility]OMY  2020    [facility]     Social History     Socioeconomic History    Marital status:      Spouse name: Not on file    Number of children: Not on file    Years of education: Not on file    Highest education level: Not on file   Occupational History    Not on file   Tobacco Use    Smoking status: Never Smoker    Smokeless tobacco: Never Used   Vaping Use    Vaping Use: Never assessed   Substance and Sexual Activity    Alcohol use: Yes     Comment: socially    Drug use: Never    Sexual activity: Not on file   Other Topics Concern    Not on file   Social History Narrative    Not on file     Social Determinants of Health     Financial Resource Strain: Low Risk     Difficulty of Paying Living Expenses: Not hard at all   Food Insecurity: No Food Insecurity    Worried About [address] in the Last Year: Never true    [address] [facility] [employer] in the Last Year: Never true   Transportation Needs:     Lack of Transportation (Medical):      Lack of Transportation (Non-Medical):    Physical Activity:     Days of Exercise per Week:     Minutes of Exercise per Session:    Stress:     Feeling of Stress :    Social Connections:     Frequency of Communication with Friends and Family:     Frequency of Social Gatherings with Friends and Family:     Attends Temple Services:     Active Member of Clubs or Organizations:     Attends Club or Organization Meetings:     Marital Status:    Intimate Partner Violence:     Fear of Current or Ex-Partner:     Emotionally Abused:     Physically Abused:     Sexually Abused:      Family History   Problem Relation Age of Onset    Heart Disease Father         a fib/chf    Other Maternal Grandmother         no fh of aneurysms  Cancer Maternal Grandfather         no fdr or sdr with cancer other than aunt pancreatic cancer. (MATERNAL)    Other Paternal Grandfather         no congenital heart disease in famiy. No Known Allergies    Current Outpatient Medications:     triamcinolone (KENALOG) 0.025 % cream, Apply topically 2 times daily. , Disp: 1 Tube, Rfl: 0    SYRINGE-NEEDLE, DISP, 3 ML (B-D INTEGRA SYRINGE) 22G X 1-1/2\" 3 ML MISC, 1 each by Does not apply route daily, Disp: 20 each, Rfl: 6    fexofenadine (ALLEGRA) 60 MG tablet, Take 60 mg by mouth, Disp: , Rfl:     testosterone cypionate (DEPOTESTOTERONE CYPIONATE) 200 MG/ML injection, Inject 1mL Q 2 weeks (Patient taking differently: Indications: order per OV note 8/5/20 Inject 1mL Q 2 weeks), Disp: 10 mL, Rfl: 1  Lab Results   Component Value Date     07/19/2021    K 4.3 07/19/2021     07/19/2021    CO2 24 07/19/2021    BUN 11 07/19/2021    CREATININE 0.89 07/19/2021    GLUCOSE 88 07/19/2021    CALCIUM 9.5 07/19/2021    PROT 7.2 07/19/2021    LABALBU 4.7 (H) 07/19/2021    BILITOT 0.4 07/19/2021    ALKPHOS 66 07/19/2021    AST 22 07/19/2021    ALT 25 07/19/2021    LABGLOM >60.0 07/19/2021    GFRAA >60.0 07/19/2021    GLOB 2.5 07/19/2021     Lab Results   Component Value Date    WBC 7.6 07/19/2021    HGB 15.4 07/19/2021    HCT 45.6 07/19/2021    MCV 87.6 07/19/2021     07/19/2021     Lab Results   Component Value Date    LABA1C 5.6 07/19/2021    LABA1C 5.4 11/19/2018     Lab Results   Component Value Date    CHOLFAST 179 07/19/2021    TRIGLYCFAST 278 (H) 07/19/2021    HDL 29 (L) 07/19/2021    LDLCALC 94 07/19/2021     Lab Results   Component Value Date    TESTM 490 09/01/2021    TESTM 414 02/11/2021    TESTM 406 07/30/2020     Lab Results   Component Value Date    TSHREFLEX 1.440 07/19/2021    TSHREFLEX 1.470 01/27/2020    T4FREE 1.15 01/27/2020     Lab Results   Component Value Date    TPOABS <10.0 01/29/2020       Review of Systems   Constitutional: Positive for fatigue. Endocrine: Negative. Genitourinary: Negative. All other systems reviewed and are negative. Objective:   Physical Exam  Vitals reviewed. Constitutional:       Appearance: Normal appearance. He is obese. HENT:      Head: Normocephalic and atraumatic. Hair is normal.      Right Ear: External ear normal.      Left Ear: External ear normal.      Nose: Nose normal.   Eyes:      General: No scleral icterus. Right eye: No discharge. Left eye: No discharge. Extraocular Movements: Extraocular movements intact. Conjunctiva/sclera: Conjunctivae normal.   Neck:      Trachea: Trachea normal.   Cardiovascular:      Rate and Rhythm: Normal rate. Pulmonary:      Effort: Pulmonary effort is normal.   Musculoskeletal:         General: Normal range of motion. Cervical back: Normal range of motion and neck supple. Neurological:      General: No focal deficit present. Mental Status: He is alert and oriented to person, place, and time.    Psychiatric:         Mood and Affect: Mood normal.         Behavior: Behavior normal.

## 2021-09-22 ENCOUNTER — NURSE ONLY (OUTPATIENT)
Dept: ENDOCRINOLOGY | Age: 31
End: 2021-09-22
Payer: COMMERCIAL

## 2021-09-22 DIAGNOSIS — E29.1 HYPOGONADISM IN MALE: Primary | ICD-10-CM

## 2021-09-22 PROCEDURE — 96372 THER/PROPH/DIAG INJ SC/IM: CPT | Performed by: INTERNAL MEDICINE

## 2021-09-22 RX ORDER — TESTOSTERONE CYPIONATE 200 MG/ML
200 INJECTION INTRAMUSCULAR ONCE
Status: COMPLETED | OUTPATIENT
Start: 2021-09-22 | End: 2021-09-22

## 2021-09-22 RX ADMIN — TESTOSTERONE CYPIONATE 200 MG: 200 INJECTION INTRAMUSCULAR at 14:55

## 2021-09-22 NOTE — PROGRESS NOTES
Patient given Testosterone 200mg IM left gluteal..  Will return in 2 weeks for next injection    Patient tolerated injection well.     Administrations This Visit     testosterone cypionate (DEPOTESTOTERONE CYPIONATE) injection 200 mg     Admin Date  09/22/2021 Action  Given Dose  200 mg Route  [facility] Administered By  Nahun Tinsley LPN

## 2021-10-06 ENCOUNTER — NURSE ONLY (OUTPATIENT)
Dept: ENDOCRINOLOGY | Age: 31
End: 2021-10-06
Payer: COMMERCIAL

## 2021-10-06 DIAGNOSIS — E29.1 HYPOGONADISM IN MALE: Primary | ICD-10-CM

## 2021-10-06 PROCEDURE — 96372 THER/PROPH/DIAG INJ SC/IM: CPT | Performed by: INTERNAL MEDICINE

## 2021-10-06 RX ORDER — TESTOSTERONE CYPIONATE 200 MG/ML
200 INJECTION INTRAMUSCULAR ONCE
Status: COMPLETED | OUTPATIENT
Start: 2021-10-06 | End: 2021-10-06

## 2021-10-06 RX ADMIN — TESTOSTERONE CYPIONATE 200 MG: 200 INJECTION INTRAMUSCULAR at 15:28

## 2021-10-06 NOTE — PROGRESS NOTES
Patient given Testosterone 200mg IM right gluteal..  Will return in 2 weeks for next injection    Patient tolerated injection well.     Administrations This Visit     testosterone cypionate (DEPOTESTOTERONE CYPIONATE) injection 200 mg     Admin Date  10/06/2021 Action  Given Dose  200 mg Route  [facility] Administered By  Facundo Ferro LPN

## 2021-10-11 ENCOUNTER — HOSPITAL ENCOUNTER (OUTPATIENT)
Dept: MRI IMAGING | Age: 31
Discharge: HOME | End: 2021-10-13
Payer: COMMERCIAL

## 2021-10-11 DIAGNOSIS — R94.131 ABNORMAL EMG: ICD-10-CM

## 2021-10-11 PROCEDURE — 73220 MRI UPPR EXTREMITY W/O&W/DYE: CPT

## 2021-10-11 PROCEDURE — 72156 MRI NECK SPINE W/O & W/DYE: CPT

## 2021-10-11 PROCEDURE — 6360000004 HC RX CONTRAST MEDICATION: Performed by: INTERNAL MEDICINE

## 2021-10-11 PROCEDURE — A9579 GAD-BASE MR CONTRAST NOS,1ML: HCPCS | Performed by: INTERNAL MEDICINE

## 2021-10-11 RX ORDER — SODIUM CHLORIDE 9 MG/ML
10 INJECTION INTRAVENOUS ONCE
Status: DISCONTINUED | OUTPATIENT
Start: 2021-10-11 | End: 2021-10-14 | Stop reason: HOSPADM

## 2021-10-11 RX ADMIN — GADOTERIDOL 20 ML: 279.3 INJECTION, SOLUTION INTRAVENOUS at 08:58

## 2021-10-21 ENCOUNTER — OFFICE VISIT (OUTPATIENT)
Dept: FAMILY MEDICINE CLINIC | Age: 31
End: 2021-10-21
Payer: COMMERCIAL

## 2021-10-21 VITALS
RESPIRATION RATE: 15 BRPM | TEMPERATURE: 98.1 F | BODY MASS INDEX: 44.73 KG/M2 | HEIGHT: 69 IN | OXYGEN SATURATION: 97 % | WEIGHT: 302 LBS | SYSTOLIC BLOOD PRESSURE: 115 MMHG | DIASTOLIC BLOOD PRESSURE: 68 MMHG | HEART RATE: 80 BPM

## 2021-10-21 DIAGNOSIS — M54.6 LEFT-SIDED THORACIC BACK PAIN, UNSPECIFIED CHRONICITY: ICD-10-CM

## 2021-10-21 DIAGNOSIS — M54.12 C6 RADICULOPATHY: Primary | ICD-10-CM

## 2021-10-21 DIAGNOSIS — E78.1 HYPERTRIGLYCERIDEMIA: ICD-10-CM

## 2021-10-21 PROCEDURE — G8417 CALC BMI ABV UP PARAM F/U: HCPCS | Performed by: INTERNAL MEDICINE

## 2021-10-21 PROCEDURE — G8484 FLU IMMUNIZE NO ADMIN: HCPCS | Performed by: INTERNAL MEDICINE

## 2021-10-21 PROCEDURE — 99214 OFFICE O/P EST MOD 30 MIN: CPT | Performed by: INTERNAL MEDICINE

## 2021-10-21 PROCEDURE — G8427 DOCREV CUR MEDS BY ELIG CLIN: HCPCS | Performed by: INTERNAL MEDICINE

## 2021-10-21 PROCEDURE — 1036F TOBACCO NON-USER: CPT | Performed by: INTERNAL MEDICINE

## 2021-10-21 ASSESSMENT — ENCOUNTER SYMPTOMS
BACK PAIN: 0
EYE PAIN: 0
ABDOMINAL PAIN: 0
SHORTNESS OF BREATH: 0

## 2021-10-21 NOTE — PROGRESS NOTES
Subjective:      Patient ID: Pablo Matias is a 33 y.o. male who presents today with:  Chief Complaint   Patient presents with    Follow-up    Discuss Labs       HPI     Here for mri results c spine   Left arm pain  About the same  Also with intermittent left sided mid thoracic pain  On and off  No falls, no trauma. Past Medical History:   Diagnosis Date    Hypogonadism in male      Past Surgical History:   Procedure Laterality Date    [facility]Y  2020    [facility]     Social History     Socioeconomic History    Marital status:      Spouse name: Not on file    Number of children: Not on file    Years of education: Not on file    Highest education level: Not on file   Occupational History    Not on file   Tobacco Use    Smoking status: Never Smoker    Smokeless tobacco: Never Used   Vaping Use    Vaping Use: Never assessed   Substance and Sexual Activity    Alcohol use: Yes     Comment: socially    Drug use: Never    Sexual activity: Not on file   Other Topics Concern    Not on file   Social History Narrative    Not on file     Social Determinants of Health     Financial Resource Strain: Low Risk     Difficulty of Paying Living Expenses: Not hard at all   Food Insecurity: No Food Insecurity    Worried About [address] in the Last Year: Never true    [address]  in the Last Year: Never true   Transportation Needs:     Lack of Transportation (Medical):      Lack of Transportation (Non-Medical):    Physical Activity:     Days of Exercise per Week:     Minutes of Exercise per Session:    Stress:     Feeling of Stress :    Social Connections:     Frequency of Communication with Friends and Family:     Frequency of Social Gatherings with Friends and Family:     Attends Alevism Services:     Active Member of Clubs or Organizations:     Attends Club or Organization Meetings:     Marital Status:    Intimate Partner Violence:     Fear of Current or Ex-Partner:     Emotionally Abused:     Physically Abused:     Sexually Abused:      No Known Allergies  Current Outpatient Medications on File Prior to Visit   Medication Sig Dispense Refill    testosterone cypionate (DEPOTESTOTERONE CYPIONATE) 200 MG/ML injection Inject 1mL Q 2 weeks 10 mL 1    triamcinolone (KENALOG) 0.025 % cream Apply topically 2 times daily. 1 Tube 0    SYRINGE-NEEDLE, DISP, 3 ML (B-D INTEGRA SYRINGE) 22G X 1-1/2\" 3 ML MISC 1 each by Does not apply route daily 20 each 6    fexofenadine (ALLEGRA) 60 MG tablet Take 60 mg by mouth       No current facility-administered medications on file prior to visit. I have personally reviewed the ROS, PMH, PFH, and social history     Review of Systems   Constitutional: Negative for chills. HENT: Negative for congestion. Eyes: Negative for pain. Respiratory: Negative for shortness of breath. Cardiovascular: Negative for chest pain. Gastrointestinal: Negative for abdominal pain. Genitourinary: Negative for hematuria. Musculoskeletal: Negative for back pain. Allergic/Immunologic: Negative for immunocompromised state. Neurological: Negative for headaches. Psychiatric/Behavioral: Negative for hallucinations. Objective:   /68 (Site: Right Upper Arm, Position: Sitting, Cuff Size: Large Adult)   Pulse 80   Temp 98.1 °F (36.7 °C) (Temporal)   Resp 15   Ht 5' 9\" (1.753 m)   Wt (!) 302 lb (137 kg)   SpO2 97%   BMI 44.60 kg/m²     Physical Exam  Constitutional:       General: He is not in acute distress. Appearance: Normal appearance. He is not ill-appearing, toxic-appearing or diaphoretic. HENT:      Head: Normocephalic. Neck:      Vascular: No carotid bruit. Cardiovascular:      Rate and Rhythm: Normal rate and regular rhythm. Pulses: Normal pulses. Heart sounds: Normal heart sounds. No murmur heard. No friction rub. No gallop. Pulmonary:      Effort: Pulmonary effort is normal. No respiratory distress.       Breath sounds: Normal breath sounds. No wheezing, rhonchi or rales. Abdominal:      General: Abdomen is flat. There is no distension. Palpations: Abdomen is soft. Tenderness: There is no abdominal tenderness. There is no right CVA tenderness, left CVA tenderness, guarding or rebound. Musculoskeletal:      Cervical back: Neck supple. Right lower leg: No edema. Left lower leg: No edema. Comments: ttp left upper paraspinal  No erythema, no warmth    Skin:     General: Skin is warm. Findings: No erythema or rash. Neurological:      Mental Status: He is alert. Psychiatric:         Mood and Affect: Mood normal.           Assessment:       Diagnosis Orders   1. C6 radiculopathy  Annita Brown MD, Neurosurgery, [facility]    Ambulatory referral to Physical Therapy   2. Hypertriglyceridemia  Lipid, Fasting         Plan:     vc  c6 radiculoapthy see NS/pain/PT .    [facility] labs 07/2023   Topical nsaids or lidocaine to area, get xray          Orders Placed This Encounter   Procedures    Lipid, Fasting     Standing Status:   Future     Standing Expiration Date:   10/21/2022   Jann Randle MD, Neurosurgery, [place]     Referral Priority:   Routine     Referral Type:   Eval and Treat     Referral Reason:   Specialty Services Required     Referred to Provider:   Shanell Mcclain MD     Requested Specialty:   Neurosurgery     Number of Visits Requested:   James Paris MD, Pain Management, [place]     Referral Priority:   Routine     Referral Type:   Eval and Treat     Referral Reason:   Specialty Services Required     Referred to Provider:   Willie Townsend MD     Requested Specialty:   Physical Medicine and Rehab     Number of Visits Requested:   1    Ambulatory referral to Physical Therapy     Referral Priority:   Routine     Referral Type:   Eval and Treat     Referral Reason:   Specialty Services Required Requested Specialty:   Physical Therapy     Number of Visits Requested:   1     No orders of the defined types were placed in this encounter. IF YOU get new or worsening sx call me dont wait  Risk of permanent damage explained. F/u if issue above not address by specialists  If anything should change or worsen call ASAP, don't wait for next scheduled appointment. Return for Chronic condition management/appointment, worsening symptoms, call ASAP for appointment.       Robert Rivers MD

## 2021-11-03 ENCOUNTER — NURSE ONLY (OUTPATIENT)
Dept: ENDOCRINOLOGY | Age: 31
End: 2021-11-03
Payer: COMMERCIAL

## 2021-11-03 DIAGNOSIS — E29.1 HYPOGONADISM IN MALE: Primary | ICD-10-CM

## 2021-11-03 PROCEDURE — 96372 THER/PROPH/DIAG INJ SC/IM: CPT | Performed by: INTERNAL MEDICINE

## 2021-11-03 RX ORDER — TESTOSTERONE CYPIONATE 200 MG/ML
200 INJECTION INTRAMUSCULAR ONCE
Status: COMPLETED | OUTPATIENT
Start: 2021-11-03 | End: 2021-11-03

## 2021-11-03 RX ADMIN — TESTOSTERONE CYPIONATE 200 MG: 200 INJECTION INTRAMUSCULAR at 14:58

## 2021-11-03 NOTE — PROGRESS NOTES
Patient given Testosterone 200mg IM left gluteal..  Will return in 2 weeks for next injection    Patient tolerated injection well.     Administrations This Visit     testosterone cypionate (DEPOTESTOTERONE CYPIONATE) injection 200 mg     Admin Date  11/03/2021 Action  Given Dose  200 mg Route  [facility] Administered By  Michelle Avalos LPN

## 2021-11-19 ENCOUNTER — TELEPHONE (OUTPATIENT)
Dept: PAIN MANAGEMENT | Age: 31
End: 2021-11-19

## 2021-11-19 ENCOUNTER — INITIAL CONSULT (OUTPATIENT)
Dept: PAIN MANAGEMENT | Age: 31
End: 2021-11-19
Payer: COMMERCIAL

## 2021-11-19 VITALS
HEIGHT: 70 IN | BODY MASS INDEX: 43.67 KG/M2 | SYSTOLIC BLOOD PRESSURE: 116 MMHG | TEMPERATURE: 98 F | WEIGHT: 305 LBS | DIASTOLIC BLOOD PRESSURE: 60 MMHG

## 2021-11-19 DIAGNOSIS — G56.02 CARPAL TUNNEL SYNDROME OF LEFT WRIST: ICD-10-CM

## 2021-11-19 DIAGNOSIS — M25.512 CHRONIC LEFT SHOULDER PAIN: ICD-10-CM

## 2021-11-19 DIAGNOSIS — R06.83 SNORES: ICD-10-CM

## 2021-11-19 DIAGNOSIS — M54.12 CERVICAL RADICULITIS: Primary | ICD-10-CM

## 2021-11-19 DIAGNOSIS — G89.29 CHRONIC LEFT SHOULDER PAIN: ICD-10-CM

## 2021-11-19 PROCEDURE — G8417 CALC BMI ABV UP PARAM F/U: HCPCS | Performed by: PHYSICAL MEDICINE & REHABILITATION

## 2021-11-19 PROCEDURE — G8427 DOCREV CUR MEDS BY ELIG CLIN: HCPCS | Performed by: PHYSICAL MEDICINE & REHABILITATION

## 2021-11-19 PROCEDURE — G8484 FLU IMMUNIZE NO ADMIN: HCPCS | Performed by: PHYSICAL MEDICINE & REHABILITATION

## 2021-11-19 PROCEDURE — 1036F TOBACCO NON-USER: CPT | Performed by: PHYSICAL MEDICINE & REHABILITATION

## 2021-11-19 PROCEDURE — 99204 OFFICE O/P NEW MOD 45 MIN: CPT | Performed by: PHYSICAL MEDICINE & REHABILITATION

## 2021-11-19 RX ORDER — GABAPENTIN 300 MG/1
300 CAPSULE ORAL NIGHTLY
Qty: 30 CAPSULE | Refills: 0 | Status: SHIPPED | OUTPATIENT
Start: 2021-11-19 | End: 2022-02-03 | Stop reason: SDUPTHER

## 2021-11-19 RX ORDER — MELOXICAM 7.5 MG/1
7.5 TABLET ORAL DAILY
Qty: 30 TABLET | Refills: 0 | Status: SHIPPED | OUTPATIENT
Start: 2021-11-19 | End: 2022-02-03 | Stop reason: SDUPTHER

## 2021-11-19 RX ORDER — LIDOCAINE 50 MG/G
1 PATCH TOPICAL DAILY
Qty: 30 PATCH | Refills: 0 | Status: SHIPPED | OUTPATIENT
Start: 2021-11-19 | End: 2021-12-19

## 2021-11-19 ASSESSMENT — ENCOUNTER SYMPTOMS
SHORTNESS OF BREATH: 0
CONSTIPATION: 0
DIARRHEA: 0
NAUSEA: 0
BACK PAIN: 0

## 2021-11-19 NOTE — PROGRESS NOTES
Eric Diane  ([birth year])    11/19/2021    Subjective:     Eric Diane is 33 y.o. male who complains today of:    Chief Complaint   Patient presents with    Neck Pain       Eric Diane is a 33 y.o. male who presents for evaluation by request of Dr. Tracie Mccarthy for C6 radiculopathy. He has struggled with pain for 8 months. He denies any immediately-preceding traumatic or inciting events. He has been previously evaluated by Dr Yoana Darby whose records are reviewed below. He describes pain located in the left side of his neck with left arm numbness and tingling. Pain is a constant ache and is currently a 4/10 and gets up to a 8/10 at its worst and goes down to a 4/10 at its best. Pain is worse with sitting. Pain is better with laying. Pain is located 0% on the right and 100% on the left. Pain is located 10% in the neck and 90% in the arms. He denies any numbness, tingling, weakness, bowel or bladder dysfunction, saddle anesthesia, falls, history of cancer, unexplained weight loss, persistent night pain and sweats, fever, IV drug abuse, immunocompromise, chronic prednisone or antibiotic use, or any other red flag symptoms. Mood is good, denies any suicidal or homicidal ideation. Sleep is good, awakes refreshed. 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 He enjoys golfing, video games. \"Now with two kids, hardly have time for any of that anymore. \"      Allergies:  Patient has no known allergies. Past Medical History:   Diagnosis Date    Hypogonadism in male      Past Surgical History:   Procedure Laterality Date    [facility]OMY  2020    [facility]     Family History   Problem Relation Age of Onset    Heart Disease Father         a fib/chf    Other Maternal Grandmother         no fh of aneurysms    Cancer Maternal Grandfather         no fdr or sdr with cancer other than aunt pancreatic cancer. (MATERNAL)    Other Paternal Grandfather         no congenital heart disease in [facility]. Social History     Socioeconomic History    Marital status:      Spouse name: Not on file    Number of children: Not on file    Years of education: Not on file    Highest education level: Not on file   Occupational History    Not on file   Tobacco Use    Smoking status: Never Smoker    Smokeless tobacco: Never Used   Vaping Use    Vaping Use: Not on file   Substance and Sexual Activity    Alcohol use: Yes     Comment: socially    Drug use: Never    Sexual activity: Not on file   Other Topics Concern    Not on file   Social History Narrative    Not on file     Social Determinants of Health     Financial Resource Strain: Low Risk     Difficulty of Paying Living Expenses: Not hard at all   Food Insecurity: No Food Insecurity    Worried About Running Out of Food in the Last Year: Never true    [address] in the Last Year: Never true   Transportation Needs:     Lack of Transportation (Medical): Not on file    Lack of Transportation (Non-Medical):  Not on file   Physical Activity:     Days of Exercise per Week: Not on file    Minutes of Exercise per Session: Not on file   Stress:     Feeling of Stress : Not on file   Social Connections:     Frequency of Communication with Friends and Family: Not on file    Frequency of Social Gatherings with Friends and Family: Not on file    Attends Christianity Services: Not on file    Active Member of Clubs or Organizations: Not on file    Attends Club or bilaterally. Normocephalic, external ears and visible portions of nose and mouth atraumatic. Lymph: No gross neck or axillary lymphadenopathy  Cardio: No significant lower extremity edema, pulses intact without significant digit ischemia. Abd: No gross masses or large hernias appreciated. Skin: Visualized skin without any dermatomal rashes or sores. Palpation free of any tightening or subcutaneous nodules. MSK: Gait is antalgic. No significant upper limb digit ischemia appreciated. Psych: Pleasant and cooperative with the history and exam. Mood and Affect normal. Appropriately dressed with good eye contact. Judgement and insight normal. Recent and remote memory intact. Alert and Oriented x3. Neuro: Cranial nerves II-XII grossly intact. No significant pathologic reflexes appreciated. Difficulty with cervical extension. Limited cervical spine range of motion. Rotation and extension reproduces axial neck pain. Other facet provocative maneuvers are positive. Tenderness to palpation over cervical spinous processes from C4 down to T1 with bilateral cervical paraspinal muscle tenderness. No tenderness over bilateral occipital or temporal regions. Sensation intact in both arms except for left C6 paresthesias  Reflexes and strength functional for arm use, no abnormal reflexes appreciated on exam today  Strength greater than 3/5 in both arms  Spurling's positive on exam today    Left median nerve paresthesias with positive Tinel's on exam today  Left shoulder limited range of motion with tenderness over left glenohumeral joint. Outside record review:  Review of the original consultation request reveals no specific diagnostic requests or clinical concerns aside from C6 radiculopathy that require particular attention. There are no suggested, requested, or specified tests to be ordered or any prior diagnostics performed that require follow-up or further investigation.     Dr Phyllis Hyatt 10/21/21: mri c spine results, left arm pain, intermittent left sided mid throacic pain. On and off. No trauma. c6 radiculopathy. Refer to Neurosurgery, Pain management, PT. c6 radiculopathy see NS/pain/PT. Topical nsaids or lidocaine, get xray    MRI Brachial Plexus 10/11/21: left brachial plexus appears normal in caliber and signal without abnormal enhancement. Negative limited left brachial plexus MRI    MRI C Spine 10/11/21: facet arthropathy. Mild degenerative disc disease. C2/3 C3/4 C4/5 unremarkable. C5/6 disc protrusion, encroachment left C6 nerve root. Normal canal and normal right foramen. C6/7 normal canal and foramen. C7/T1 unremarkable. EMG B UE 8/25/21: mild bilateral median nerves suggesting carpal tunnel syndrome. C8/T1 mild radicular changes on the left, suggest thoracic outlet syndrome or C8/T1 radiculopathy. MRI C Spine and Brachial plexus is recommended. Component      Latest Ref Rng & Units 7/19/2021          12:25 PM   Platelet Count      323 - 400 K/uL 255     Component      Latest Ref Rng & Units 7/19/2021          12:25 PM   Creatinine      0.70 - 1.20 mg/dL 0.89     Component      Latest Ref Rng & Units 7/19/2021          12:25 PM   Alk Phos      35 - 104 U/L 66   ALT      0 - 41 U/L 25   AST      0 - 40 U/L 22     Family history of alcohol abuse 0  Family history of illegal drug abuse 0  Family history of prescription drug abuse 0    Personal history of alcohol abuse/DUI 0  Personal history of illegal drug abuse 0  Personal history of prescription drug abuse 0    Age between 17-45 +1    History of preadolescent sexual abuse 0    Personal history of obsessive compulsive disorder 0  Personal history of attention deficit disorder 0  Personal history of bipolar disorder 0  Personal history of schizophrenia 0  Personal history of depression 0    Score = 1, low risk  Assessment:      Diagnosis Orders   1.  Cervical radiculitis  [facility]    meloxicam (MOBIC) 7.5 MG tablet    gabapentin (NEURONTIN) 300 MG capsule   2. Chronic left shoulder pain  XR SHOULDER LEFT (MIN 2 VIEWS)    lidocaine (LIDODERM) 5 %    meloxicam (MOBIC) 7.5 MG tablet    OH ARTHROCENTESIS ASPIR&/INJ MAJOR JT/BURSA W/O US    CHG FLUOROSCOPIC GUIDANCE NEEDLE PLACEMENT ADD ON   3. Carpal tunnel syndrome of left wrist  meloxicam (MOBIC) 7.5 MG tablet    gabapentin (NEURONTIN) 300 MG capsule    OH INJECT CARPAL TUNNEL    OH SONO GUIDE NEEDLE BIOPSY    OH WHO COCK-UP NONMOLDE PRE OTS    [facility] - [facility]   4. Sameer Loyd MD, Pulmonology, [facility] [place]       Plan:     Periodic Controlled Substance Monitoring: Obtaining appropriate analgesic effect of treatment., Assessed functional status. Cindi Wallace MD)    Orders Placed This Encounter   Medications    lidocaine (LIDODERM) 5 %     Sig: Place 1 patch onto the skin daily 12 hours on, 12 hours off. Dispense:  30 patch     Refill:  0    meloxicam (MOBIC) 7.5 MG tablet     Sig: Take 1 tablet by mouth daily Take with food, avoid other NSAIDs (Ibuprofen) and steroids     Dispense:  30 tablet     Refill:  0    gabapentin (NEURONTIN) 300 MG capsule     Sig: Take 1 capsule by mouth nightly for 30 days.      Dispense:  30 capsule     Refill:  0       Orders Placed This Encounter   Procedures    XR SHOULDER LEFT (MIN 2 VIEWS)     Standing Status:   Future     Standing Expiration Date:   11/19/2022     Order Specific Question:   Reason for exam:     Answer:   Evaluate for fracture or osteoarthritis   Guillermina Major MD, Pulmonology, [facility]     Referral Priority:   Routine     Referral Type:   Eval and Treat     Referral Reason:   Specialty Services Required     Referred to Provider:   Larissa Jiménez MD     Requested Specialty:   Pulmonology     Number of Visits Requested:   1    [facility]     Referral Priority:   Routine     Referral Type:   Eval and Treat     Referral Reason:   Specialty Services Required Requested Specialty:   Physical Therapy     Number of Visits Requested:   Izabella Sanchez 86     Referral Priority:   Routine     Referral Type:   Eval and Treat     Referral Reason:   Specialty Services Required     Requested Specialty:   Occupational Therapy     Number of Visits Requested:   1    SC SONO GUIDE NEEDLE BIOPSY     Standing Status:   Future     Standing Expiration Date:   2/17/2022    CHG FLUOROSCOPIC GUIDANCE NEEDLE PLACEMENT ADD ON     Standing Status:   Future     Standing Expiration Date:   2/17/2022    SC INJECT CARPAL TUNNEL     Left carpal tunnel inj under US with Dr Fifi Zapien. 15 minute procedure. Standing Status:   Future     Standing Expiration Date:   2/17/2022    SC WHO COCK-UP NONMOLDE PRE OTS     LEFT     Standing Status:   Future     Standing Expiration Date:   2/17/2022    SC ARTHROCENTESIS ASPIR&/INJ MAJOR JT/BURSA W/O US     Left shoulder glenohumeral joint corticosteroid inj under XR with Dr Fifi Zapien. No anticoagulation, no antibiotics, no diabetes mellitus, no osteoporosis, no bleeding or platelet dysfunction, IV Dye okay, NKDA. 15 minute procedure     Standing Status:   Future     Standing Expiration Date:   2/17/2022       -PT for cervical radiculitis  -OT for left carpal tunnel syndrome   -He certainly has MRI evidence of cervical radiculitis. At the same time, he has some exam findings of limited shoulder range of motion as well as EMG evidence of carpal tunnel syndrome. Will try to treat these peripheral causes first given that the MRI C Spine findings appear very mild at this time.    -Reviewed MRI C Spine, MRI Brachial Plexus, and EMG B UE above, all questions answered  -Refer to Pulm giving snoring eval SHANDA  -XR L Shoulder eval osteoarthritis   -Consideration for MRI L Shoulder without contrast may be given if his symptoms do not improve with conservative treatment  -Lidoderm 5% patch #30 no refill start 11/19/2021   -Meloxicam 7.5 mg by mouth daily #30 no refills start 11/19/2021. Avoid all other NSAIDs and steroids.  -Gabapentin 300 mg by mouth every evening #30 no refills start 11/19/2021. [place] reviewed on 11/19/2021   -Consideration for Tizanidine may be given  -Will hold on opioids at this time  -Consideration for C7/T1 Cervical Epidural may be given if min relief with other interventions and has persistent left arm pain  -Left shoulder glenohumeral joint corticosteroid inj under XR with Dr Nadia Aguero. No anticoagulation, no antibiotics, no diabetes mellitus, no osteoporosis, no bleeding or platelet dysfunction, IV Dye okgricelda NKZACH. 15 minute procedure. Consider 3 mg betamethasone  -Left carpal tunnel inj under US with Dr Nadia Aguero. 15 minute procedure. Consider 3 mg betamethasone.  -Recommend left wrist splint(s) for the patient's carpal tunnel syndrome. The patient has weakness and instability of left wrists causing intermittent median nerve compression. He requires stabilization from this rigid orthosis to improve his function and reduce pain. Controlled Substance Monitoring:    Acute and Chronic Pain Monitoring:   RX Monitoring 11/19/2021   Periodic Controlled Substance Monitoring Obtaining appropriate analgesic effect of treatment. ;Assessed functional status. Discussed the risks, side effects, and symptoms that would warrant urgent or emergent physician evaluation of all medications prescribed today. The patient was advised that NSAID-type medications have three important potential side effects: gastrointestinal irritation including hemorrhage, myocardial injury including possible infarction, and kidney injury. He was asked to take the medication with food, avoid any other NSAIDs or steroids, and discontinue if he develops any concerning symptoms.  He should immediately stop the medication and proceed to the emergency department for chest pain, dark urine or difficulty with producing urine, vomiting, abdominal pain or black/tarry stools. The patient expresses understanding of these issues and all questions were answered. Discussed the risks of the above recommended procedures including but not limited to bleeding, infection, worsened pain, damage to surrounding structures, side effects, toxicity, allergic reactions to medications used, immune and stress-response dysfunction, fat necrosis, decreased bone mineralization, cartilage loss, increased fracture risk, avascular necrosis, skin pigmentation changes, blood sugar elevation, need for surgery, premature damage or degeneration of the joint, as well as catastrophic injury such as vision loss, paralysis, stroke, bowel or bladder incontinence, ventilator dependence, loss of use of the joint and/or extremity, and death. Discussed the risks, benefits, alternative procedures, and alternatives to the procedure including no procedure at all. Discussed that we cannot undo any permanent neurologic or orthopaedic damage or change the course of any underlying disease. After thorough discussion, patient expressed complete understanding and willingness to proceed. Discussed the risks of the above recommended procedures including but not limited to bleeding, infection, worsened pain, damage to surrounding structures, side effects, toxicity, allergic reactions to medications used, immune and stress-response dysfunction, fat necrosis, skin pigmentation changes, blood sugar elevation, headache, vision changes, need for surgery, as well as catastrophic injury such as vision loss, paralysis, stroke, spinal cord and/or plexus infarction or injury, intrathecal injection, spinal cord puncture, arachnoiditis, discitis, bowel or bladder incontinence, ventilator dependence, loss of use of the arms and/or legs, and death. Discussed off-label use of corticosteroids and how the Food and Drug Administration (FDA) has not approved corticosteroids for epidural use.  Discussed the risks, benefits, alternative procedures, and alternatives to the procedure including no procedure at all. Discussed that we cannot undo any permanent neurologic damage or change the course of any underlying disease. After thorough discussion, patient expressed understanding and willingness to proceed. Provided education and counseling regarding the diagnosis, prognosis, and treatment options. All questions were answered. Encouraged him to follow-up with his primary care physician and/or specialists as required for his overall health and management of his comorbidities as well as any new positive symptoms mentioned in review of systems above. Care was provided within the definitions and limitations of our specialty practice. Encouraged lifestyle interventions including healthy habits, lifestyle changes, regular aerobic exercise and appropriate weight maintenance as advised by their primary care physician or cardiovascular health provider. Discussed well care and disease prevention/maintenance. Anatomic spine model was used to illustrate pathology. All recommendations for therapy are provided to improve function with activities of daily living, decrease pain, and help develop an exercise program. All recommendations for medications are meant to help decrease pain, improve function with activities of daily living, maintain compliance with home exercise program, and improve quality of life. All recommendations for therapeutic injections are meant to help decrease pain, improve function with activities of daily living, maintain compliance with home exercise program, improve quality of life, and decrease reliance upon oral medications. Encouraged compliance with his home exercise program. Recommended compliance with physical therapy program as outlined above. Informed him to wear bracing as appropriate, and that bracing is not a replacement for core strengthening or muscle stabilization.     Discussed the elevated risks of excessive sedation while on pain medications. Advised him against driving or operating heavy machinery or performing any activities where he may harm himself or others while on pain medications. Particular caution was emphasized especially during dose adjustments and medication changes. Discussed the elevated risks of respiratory depression and death while on opioid medications, especially when combined with other sedative substances. Discussed the risks of temporary disability, permanent disability, morbidity, and mortality with poorly-managed or undiagnosed medical conditions and comorbidities. Emphasized the importance of timely medical evaluation and treatment as previously recommended by us or other medical professionals. Risks of not pursing these recommendations were emphasized. The patient was offered a treatment at our facility. The physician and patient have discussed in detail the risk of exposure to and/or potential harm posed by the COVID-19 virus with having a office visits and procedures at this time versus the risk of delaying the visits and procedures. It is not possible to know either the risk of delaying the visits or procedure or chance of getting an infection with perfect accuracy, but a joint decision was made between the patient and the physician to proceed at this time with the scheduled visits and procedures. Advised him that any lab testing, imaging, or other diagnostic test results are best discussed in person in the office so that we can provide a clear explanation of their significance and best treatment based upon these results. It is his responsibility to make and keep a follow up appointment to discuss these test results in person to discuss the significance of the findings and appropriate follow-up steps. He expressed complete understanding and agreement with the entire plan as outlined above.  Portions of this note may have been typed, auto-populated, dictated or transcribed by voice recognition resulting in errors, omissions, or close substitutions which may be missed despite careful proofreading. Please contact the author for any questions or concerns. Thank you Dr. Angela Cruz for the opportunity to participate in this patient's care. If you have any questions or concerns, please do not hesitate to contact us. Follow up:  Return in about 1 month (around 12/19/2021) for reassessment of pain and symptoms, P.T.  Internal Ref, External Referral.    Arnoldo Miller MD

## 2021-11-22 DIAGNOSIS — M25.512 CHRONIC LEFT SHOULDER PAIN: ICD-10-CM

## 2021-11-22 DIAGNOSIS — G89.29 CHRONIC LEFT SHOULDER PAIN: ICD-10-CM

## 2021-11-24 ENCOUNTER — NURSE ONLY (OUTPATIENT)
Dept: ENDOCRINOLOGY | Age: 31
End: 2021-11-24
Payer: COMMERCIAL

## 2021-11-24 DIAGNOSIS — E29.1 HYPOGONADISM IN MALE: Primary | ICD-10-CM

## 2021-11-24 PROCEDURE — 96372 THER/PROPH/DIAG INJ SC/IM: CPT | Performed by: INTERNAL MEDICINE

## 2021-11-24 RX ORDER — TESTOSTERONE CYPIONATE 200 MG/ML
200 INJECTION INTRAMUSCULAR ONCE
Status: COMPLETED | OUTPATIENT
Start: 2021-11-24 | End: 2021-11-24

## 2021-11-24 RX ADMIN — TESTOSTERONE CYPIONATE 200 MG: 200 INJECTION INTRAMUSCULAR at 14:27

## 2021-11-24 NOTE — PROGRESS NOTES
Patient given Testosterone 200mg IM right gluteal..  Will return in 2 weeks for next injection    Patient tolerated injection well.     Administrations This Visit     testosterone cypionate (DEPOTESTOTERONE CYPIONATE) injection 200 mg     Admin Date  11/24/2021 Action  Given Dose  200 mg Route  IntraMUSCular Administered By  Shane Brizuela LPN

## 2021-12-08 ENCOUNTER — NURSE ONLY (OUTPATIENT)
Dept: FAMILY MEDICINE CLINIC | Age: 31
End: 2021-12-08
Payer: COMMERCIAL

## 2021-12-08 DIAGNOSIS — E29.1 HYPOGONADISM IN MALE: Primary | ICD-10-CM

## 2021-12-08 PROCEDURE — 96372 THER/PROPH/DIAG INJ SC/IM: CPT | Performed by: FAMILY MEDICINE

## 2021-12-08 RX ORDER — TESTOSTERONE CYPIONATE 200 MG/ML
200 INJECTION INTRAMUSCULAR ONCE
Status: COMPLETED | OUTPATIENT
Start: 2021-12-08 | End: 2021-12-08

## 2021-12-08 RX ADMIN — TESTOSTERONE CYPIONATE 200 MG: 200 INJECTION INTRAMUSCULAR at 11:03

## 2021-12-08 NOTE — PROGRESS NOTES
Patient given Testosterone 200mg IM left gluteal..  Will return in 2 weeks for next injection    Patient tolerated injection well.     Administrations This Visit     testosterone cypionate (DEPOTESTOTERONE CYPIONATE) injection 200 mg     Admin Date  12/08/2021 Action  Given Dose  200 mg Route  IntraMUSCular Administered By  Ravinder Ramos LPN

## 2021-12-22 ENCOUNTER — NURSE ONLY (OUTPATIENT)
Dept: ENDOCRINOLOGY | Age: 31
End: 2021-12-22
Payer: COMMERCIAL

## 2021-12-22 DIAGNOSIS — E29.1 HYPOGONADISM IN MALE: Primary | ICD-10-CM

## 2021-12-22 PROCEDURE — 96372 THER/PROPH/DIAG INJ SC/IM: CPT | Performed by: INTERNAL MEDICINE

## 2021-12-22 RX ORDER — TESTOSTERONE CYPIONATE 200 MG/ML
200 INJECTION INTRAMUSCULAR ONCE
Status: COMPLETED | OUTPATIENT
Start: 2021-12-22 | End: 2021-12-22

## 2021-12-22 RX ADMIN — TESTOSTERONE CYPIONATE 200 MG: 200 INJECTION INTRAMUSCULAR at 15:48

## 2021-12-22 NOTE — PROGRESS NOTES
Patient given Testosterone 200mg IM right gluteal..  Will return in 2 weeks for next injection    Patient tolerated injection well.     Administrations This Visit     testosterone cypionate (DEPOTESTOTERONE CYPIONATE) injection 200 mg     Admin Date  12/22/2021 Action  Given Dose  200 mg Route  IntraMUSCular Administered By  Michelle Avalos LPN

## 2022-01-05 ENCOUNTER — NURSE ONLY (OUTPATIENT)
Dept: ENDOCRINOLOGY | Age: 32
End: 2022-01-05
Payer: COMMERCIAL

## 2022-01-05 DIAGNOSIS — E29.1 HYPOGONADISM IN MALE: Primary | ICD-10-CM

## 2022-01-05 PROCEDURE — 96372 THER/PROPH/DIAG INJ SC/IM: CPT | Performed by: INTERNAL MEDICINE

## 2022-01-05 RX ORDER — TESTOSTERONE CYPIONATE 200 MG/ML
200 INJECTION INTRAMUSCULAR ONCE
Status: COMPLETED | OUTPATIENT
Start: 2022-01-05 | End: 2022-01-05

## 2022-01-05 RX ADMIN — TESTOSTERONE CYPIONATE 200 MG: 200 INJECTION INTRAMUSCULAR at 14:52

## 2022-01-05 NOTE — PROGRESS NOTES
Patient given Testosterone 200mg IM left gluteal..  Will return in 2 weeks for next injection    Patient tolerated injection well.     Administrations This Visit     testosterone cypionate (DEPOTESTOTERONE CYPIONATE) injection 200 mg     Admin Date  01/05/2022 Action  Given Dose  200 mg Route  IntraMUSCular Administered By  Seth Macias LPN
Breath sounds clear and equal bilaterally.

## 2022-01-19 ENCOUNTER — NURSE ONLY (OUTPATIENT)
Dept: ENDOCRINOLOGY | Age: 32
End: 2022-01-19
Payer: COMMERCIAL

## 2022-01-19 DIAGNOSIS — E29.1 HYPOGONADISM IN MALE: Primary | ICD-10-CM

## 2022-01-19 PROCEDURE — 96372 THER/PROPH/DIAG INJ SC/IM: CPT | Performed by: INTERNAL MEDICINE

## 2022-01-19 RX ORDER — TESTOSTERONE CYPIONATE 200 MG/ML
200 INJECTION INTRAMUSCULAR ONCE
Status: COMPLETED | OUTPATIENT
Start: 2022-01-19 | End: 2022-01-19

## 2022-01-19 RX ADMIN — TESTOSTERONE CYPIONATE 200 MG: 200 INJECTION INTRAMUSCULAR at 14:42

## 2022-01-19 NOTE — PROGRESS NOTES
Patient given Testosterone 200mg IM left gluteal..  Will return in 2 weeks for next injection    Patient tolerated injection well.     Administrations This Visit     testosterone cypionate (DEPOTESTOTERONE CYPIONATE) injection 200 mg     Admin Date  01/19/2022 Action  Given Dose  200 mg Route  IntraMUSCular Administered By  April Padgett LPN

## 2022-02-02 ENCOUNTER — NURSE ONLY (OUTPATIENT)
Dept: ENDOCRINOLOGY | Age: 32
End: 2022-02-02
Payer: COMMERCIAL

## 2022-02-02 ENCOUNTER — PATIENT MESSAGE (OUTPATIENT)
Dept: PAIN MANAGEMENT | Age: 32
End: 2022-02-02

## 2022-02-02 DIAGNOSIS — G89.29 CHRONIC LEFT SHOULDER PAIN: ICD-10-CM

## 2022-02-02 DIAGNOSIS — M25.512 CHRONIC LEFT SHOULDER PAIN: ICD-10-CM

## 2022-02-02 DIAGNOSIS — M54.12 CERVICAL RADICULITIS: ICD-10-CM

## 2022-02-02 DIAGNOSIS — E29.1 HYPOGONADISM IN MALE: Primary | ICD-10-CM

## 2022-02-02 DIAGNOSIS — G56.02 CARPAL TUNNEL SYNDROME OF LEFT WRIST: ICD-10-CM

## 2022-02-02 PROCEDURE — 96372 THER/PROPH/DIAG INJ SC/IM: CPT | Performed by: INTERNAL MEDICINE

## 2022-02-02 RX ORDER — TESTOSTERONE CYPIONATE 200 MG/ML
200 INJECTION INTRAMUSCULAR ONCE
Status: COMPLETED | OUTPATIENT
Start: 2022-02-02 | End: 2022-02-02

## 2022-02-02 RX ADMIN — TESTOSTERONE CYPIONATE 200 MG: 200 INJECTION INTRAMUSCULAR at 14:15

## 2022-02-02 NOTE — TELEPHONE ENCOUNTER
From: Monserrat Dumont  To: Dr. Todd Seeds: 2/2/2022 12:30 PM EST  Subject: Prescriptions    Hello,   I was wondering if the two medications that were prescribed by Dr. Ana Ho could be sent to the [facility] in [place] again? They said they didn't see them in the system or something like that. This way I could maybe pick them up on my way home from work. Thank you!

## 2022-02-02 NOTE — PROGRESS NOTES
Patient given Testosterone 200mg IM right gluteal..  Will return in 2 weeks for next injection    Patient tolerated injection well.     Administrations This Visit     testosterone cypionate (DEPOTESTOTERONE CYPIONATE) injection 200 mg     Admin Date  02/02/2022 Action  Given Dose  200 mg Route  IntraMUSCular Administered By  Carla Hinojosa LPN

## 2022-02-02 NOTE — TELEPHONE ENCOUNTER
Requested Prescriptions     Pending Prescriptions Disp Refills    meloxicam (MOBIC) 7.5 MG tablet 30 tablet 0     Sig: Take 1 tablet by mouth daily Take with food, avoid other NSAIDs (Ibuprofen) and steroids    gabapentin (NEURONTIN) 300 MG capsule 30 capsule 0     Sig: Take 1 capsule by mouth nightly for 30 days.        Patient last seen on:  12/14/21  Date of last surgery:  NA  Date of last refill:  11/19/21  Pain level:  NA  Patient complaining of:  PT REQUEST VIA [employer]  Future appts: NONE

## 2022-02-03 RX ORDER — MELOXICAM 7.5 MG/1
7.5 TABLET ORAL DAILY
Qty: 30 TABLET | Refills: 0 | Status: SHIPPED | OUTPATIENT
Start: 2022-02-03 | End: 2022-07-19 | Stop reason: ALTCHOICE

## 2022-02-03 RX ORDER — GABAPENTIN 300 MG/1
300 CAPSULE ORAL NIGHTLY
Qty: 30 CAPSULE | Refills: 0 | Status: SHIPPED | OUTPATIENT
Start: 2022-02-03 | End: 2022-07-19 | Stop reason: ALTCHOICE

## 2022-02-16 ENCOUNTER — NURSE ONLY (OUTPATIENT)
Dept: ENDOCRINOLOGY | Age: 32
End: 2022-02-16
Payer: COMMERCIAL

## 2022-02-16 DIAGNOSIS — E29.1 HYPOGONADISM IN MALE: Primary | ICD-10-CM

## 2022-02-16 PROCEDURE — 96372 THER/PROPH/DIAG INJ SC/IM: CPT | Performed by: INTERNAL MEDICINE

## 2022-02-16 RX ORDER — TESTOSTERONE CYPIONATE 200 MG/ML
200 INJECTION INTRAMUSCULAR ONCE
Status: COMPLETED | OUTPATIENT
Start: 2022-02-16 | End: 2022-02-16

## 2022-02-16 RX ADMIN — TESTOSTERONE CYPIONATE 200 MG: 200 INJECTION INTRAMUSCULAR at 14:33

## 2022-02-16 NOTE — PROGRESS NOTES
Patient given Testosterone 200mg IM left gluteal..  Will return in 2 weeks for next injection    Patient tolerated injection well.     Administrations This Visit     testosterone cypionate (DEPOTESTOTERONE CYPIONATE) injection 200 mg     Admin Date  02/16/2022 Action  Given Dose  200 mg Route  IntraMUSCular Administered By  Ghulam Nicole LPN

## 2022-03-02 ENCOUNTER — NURSE ONLY (OUTPATIENT)
Dept: ENDOCRINOLOGY | Age: 32
End: 2022-03-02
Payer: COMMERCIAL

## 2022-03-02 DIAGNOSIS — E29.1 HYPOGONADISM IN MALE: Primary | ICD-10-CM

## 2022-03-02 PROCEDURE — 96372 THER/PROPH/DIAG INJ SC/IM: CPT | Performed by: INTERNAL MEDICINE

## 2022-03-02 RX ORDER — TESTOSTERONE CYPIONATE 200 MG/ML
200 INJECTION INTRAMUSCULAR ONCE
Status: COMPLETED | OUTPATIENT
Start: 2022-03-02 | End: 2022-03-02

## 2022-03-02 RX ADMIN — TESTOSTERONE CYPIONATE 200 MG: 200 INJECTION INTRAMUSCULAR at 14:41

## 2022-03-02 NOTE — PROGRESS NOTES
Patient given Testosterone 200mg IM right gluteal..  Will return in 2 weeks for next injection    Patient tolerated injection well.     Administrations This Visit     testosterone cypionate (DEPOTESTOTERONE CYPIONATE) injection 200 mg     Admin Date  03/02/2022 Action  Given Dose  200 mg Route  [facility] Administered By  Kaushal Douglas LPN

## 2022-03-08 ENCOUNTER — OFFICE VISIT (OUTPATIENT)
Dept: PULMONOLOGY | Age: 32
End: 2022-03-08
Payer: COMMERCIAL

## 2022-03-08 VITALS
WEIGHT: 312 LBS | HEART RATE: 96 BPM | TEMPERATURE: 98.1 F | HEIGHT: 70 IN | BODY MASS INDEX: 44.67 KG/M2 | SYSTOLIC BLOOD PRESSURE: 134 MMHG | OXYGEN SATURATION: 98 % | DIASTOLIC BLOOD PRESSURE: 88 MMHG

## 2022-03-08 DIAGNOSIS — G47.30 SLEEP APNEA, UNSPECIFIED TYPE: Primary | ICD-10-CM

## 2022-03-08 DIAGNOSIS — E66.01 MORBID OBESITY (HCC): ICD-10-CM

## 2022-03-08 PROCEDURE — G8427 DOCREV CUR MEDS BY ELIG CLIN: HCPCS | Performed by: INTERNAL MEDICINE

## 2022-03-08 PROCEDURE — G8484 FLU IMMUNIZE NO ADMIN: HCPCS | Performed by: INTERNAL MEDICINE

## 2022-03-08 PROCEDURE — G8417 CALC BMI ABV UP PARAM F/U: HCPCS | Performed by: INTERNAL MEDICINE

## 2022-03-08 PROCEDURE — 99244 OFF/OP CNSLTJ NEW/EST MOD 40: CPT | Performed by: INTERNAL MEDICINE

## 2022-03-08 ASSESSMENT — ENCOUNTER SYMPTOMS
DIARRHEA: 0
WHEEZING: 0
ABDOMINAL PAIN: 0
CHEST TIGHTNESS: 0
NAUSEA: 0
RHINORRHEA: 0
VOMITING: 0
SHORTNESS OF BREATH: 0
SORE THROAT: 0
VOICE CHANGE: 0
COUGH: 0
EYE ITCHING: 0

## 2022-03-08 NOTE — PROGRESS NOTES
Subjective:     Ellie Perry is a 33 y.o. male who complains today of:     Chief Complaint   Patient presents with    New Patient     SHANDA       HPI  He was sent here for possible sleep apnea. He is complaining of snoring and daytime sleepiness and tiredness. Not sure about C/o witness apnea. He does not wakes up with gasping for air. He wakes up 3 times during sleep   No complaint of morning headache. He does not have restful sleep. He does not take daily naps. He does fall asleep while watching TV. He does  have a complaint of sleepiness while driving only long distance   He does not have history of motor vehicle accident due to falling a sleep while driving  . He does not have difficulty falling sleep or staying asleep  18 lbs Weight gain in last 9 month   No sleep walking,  talking or eating . No sleep onset hallucination  No sleep paralysis . No sleep attacks      Allergies:  Patient has no known allergies. Past Medical History:   Diagnosis Date    Hypogonadism in male     Sleep apnea      Past Surgical History:   Procedure Laterality Date    [facility]  2020    [facility]     Family History   Problem Relation Age of Onset    Heart Disease Father         a fib/chf    Other Maternal Grandmother         no fh of aneurysms    Cancer Maternal Grandfather         no fdr or sdr with cancer other than aunt pancreatic cancer. (MATERNAL)    Other Paternal Grandfather         no congenital heart disease in [facility].      Social History     Socioeconomic History    Marital status:      Spouse name: Not on file    Number of children: Not on file    Years of education: Not on file    Highest education level: Not on file   Occupational History    Not on file   Tobacco Use    Smoking status: Never Smoker    Smokeless tobacco: Never Used   Vaping Use    Vaping Use: Not on file   Substance and Sexual Activity    Alcohol use: Yes     Comment: socially    Drug use: Never    Sexual activity: Not on file   Other Topics Concern    Not on file   Social History Narrative    Not on file     Social Determinants of Health     Financial Resource Strain: Low Risk     Difficulty of Paying Living Expenses: Not hard at all   Food Insecurity: No Food Insecurity    Worried About Running Out of Food in the Last Year: Never true    [address] in the Last Year: Never true   Transportation Needs:     Lack of Transportation (Medical): Not on file    Lack of Transportation (Non-Medical): Not on file   Physical Activity:     Days of Exercise per Week: Not on file    Minutes of Exercise per Session: Not on file   Stress:     Feeling of Stress : Not on file   Social Connections:     Frequency of Communication with Friends and Family: Not on file    Frequency of Social Gatherings with Friends and Family: Not on file    Attends Evangelical Services: Not on file    Active Member of [address] [place] [address] [employer] or Organizations: Not on file    Attends Club or Organization Meetings: Not on file    Marital Status: Not on file   Intimate Partner Violence:     Fear of Current or Ex-Partner: Not on file    Emotionally Abused: Not on file    Physically Abused: Not on file    Sexually Abused: Not on file   Housing Stability:     Unable to Pay for Housing in the Last Year: Not on file    Number of [place] in the Last Year: Not on file    Unstable Housing in the Last Year: Not on file         Review of Systems   Constitutional: Negative for chills, diaphoresis, fatigue and fever. HENT: Negative for congestion, mouth sores, nosebleeds, postnasal drip, rhinorrhea, sneezing, sore throat and voice change. Eyes: Negative for itching and visual disturbance. Respiratory: Negative for cough, chest tightness, shortness of breath and wheezing. Cardiovascular: Negative. Negative for chest pain, palpitations and leg swelling. Gastrointestinal: Negative for abdominal pain, diarrhea, nausea and vomiting.    Genitourinary: Negative for difficulty urinating and hematuria. Musculoskeletal: Negative for arthralgias, joint swelling and myalgias. Skin: Negative for rash. Allergic/Immunologic: Negative for environmental allergies. Neurological: Negative for dizziness, tremors, weakness and headaches. Psychiatric/Behavioral: Positive for sleep disturbance. Negative for behavioral problems. :     Vitals:    03/08/22 1032   BP: 134/88   Pulse: 96   Temp: 98.1 °F (36.7 °C)   SpO2: 98%   Weight: (!) 312 lb (141.5 kg)   Height: 5' 10\" (1.778 m)     Wt Readings from Last 3 Encounters:   03/08/22 (!) 312 lb (141.5 kg)   11/19/21 (!) 305 lb (138.3 kg)   10/21/21 (!) 302 lb (137 kg)         Physical Exam  Constitutional:       Appearance: He is well-developed. He is obese. HENT:      Head: Normocephalic and atraumatic. Nose: Nose normal.      Mouth/Throat:      Comments: Narrow airway. Eyes:      Conjunctiva/sclera: Conjunctivae normal.      Pupils: Pupils are equal, round, and reactive to light. Neck:      Thyroid: No thyromegaly. Vascular: No JVD. Trachea: No tracheal deviation. Cardiovascular:      Rate and Rhythm: Normal rate and regular rhythm. Heart sounds: No murmur heard. No friction rub. No gallop. Pulmonary:      Effort: Pulmonary effort is normal. No respiratory distress. Breath sounds: Normal breath sounds. No wheezing or rales. Chest:      Chest wall: No tenderness. Abdominal:      General: There is no distension. Musculoskeletal:         General: Normal range of motion. Lymphadenopathy:      Cervical: No cervical adenopathy. Skin:     General: Skin is warm and dry. Findings: No rash. Neurological:      Mental Status: He is alert and oriented to person, place, and time. Cranial Nerves: No cranial nerve deficit.    Psychiatric:         Behavior: Behavior normal.         Current Outpatient Medications   Medication Sig Dispense Refill    testosterone cypionate (Carlitos Santamaria CYPIONATE) 200 MG/ML injection Inject 1mL Q 2 weeks 10 mL 1    triamcinolone (KENALOG) 0.025 % cream Apply topically 2 times daily. 1 Tube 0    SYRINGE-NEEDLE, DISP, 3 ML (B-D INTEGRA SYRINGE) 22G X 1-1/2\" 3 ML MISC 1 each by Does not apply route daily 20 each 6    fexofenadine (ALLEGRA) 60 MG tablet Take 60 mg by mouth      meloxicam (MOBIC) 7.5 MG tablet Take 1 tablet by mouth daily Take with food, avoid other NSAIDs (Ibuprofen) and steroids 30 tablet 0    gabapentin (NEURONTIN) 300 MG capsule Take 1 capsule by mouth nightly for 30 days. 30 capsule 0     No current facility-administered medications for this visit. Results for orders placed in visit on 07/19/21    XR CHEST (2 VW)    Narrative  Exam: XR CHEST (2 VW)    CLINICAL HISTORY: R07.89 Chest tightness ICD10    COMPARISON: None    CHEST X-ray, 2 VIEWS  FINDINGS:      The cardiomediastinal silhouette is within normal limits. There are no infiltrates, consolidations or effusions. Bones of the thorax appear intact. Impression  No radiographic evidence of acute intrathoracic process. Assessment/Plan:     1. Sleep apnea, unspecified type    He is complaining of snoring and daytime sleepiness and tiredness. Not sure about C/o witness apnea. He does not wakes up with gasping for air. He does not have restful sleep. He does fall asleep while watching TV. He does  have a complaint of sleepiness while driving only long distance 18 lbs Weight gain in last 9 month , will request PSG to r/o SHANDA     - PSG     Counseling: Driving: He is advised for extreme caution when driving or operating machinery if there is a feeling of drowsiness, especially while driving it is preferable to stop driving and take a brief nap. Sleep hygiene:Avoid supine sleep, sleep on  sides. Avoid  sleep deprivation. Explained sleep hygiene. Advice to avoid Alcohol and sedativ    2. Morbid obesity ([address]  He is advised try to lose weight.  obesity related risk explained to the patient ,  Current weight:  (!) 312 lb (141.5 kg) Lbs. BMI:  Body mass index is 44.77 kg/m². Suggested weight control approaches, including dietary changes , exercise, behavioral modification. Return in about 4 weeks (around 4/5/2022) for oscar, sleep study.       Duarte Bravo MD

## 2022-03-16 ENCOUNTER — NURSE ONLY (OUTPATIENT)
Dept: ENDOCRINOLOGY | Age: 32
End: 2022-03-16
Payer: COMMERCIAL

## 2022-03-16 ENCOUNTER — OFFICE VISIT (OUTPATIENT)
Dept: ENDOCRINOLOGY | Age: 32
End: 2022-03-16
Payer: COMMERCIAL

## 2022-03-16 VITALS
SYSTOLIC BLOOD PRESSURE: 99 MMHG | DIASTOLIC BLOOD PRESSURE: 64 MMHG | OXYGEN SATURATION: 97 % | HEART RATE: 59 BPM | WEIGHT: 315 LBS | BODY MASS INDEX: 45.2 KG/M2

## 2022-03-16 DIAGNOSIS — E29.1 HYPOGONADISM IN MALE: Primary | ICD-10-CM

## 2022-03-16 PROCEDURE — G8484 FLU IMMUNIZE NO ADMIN: HCPCS | Performed by: INTERNAL MEDICINE

## 2022-03-16 PROCEDURE — G8417 CALC BMI ABV UP PARAM F/U: HCPCS | Performed by: INTERNAL MEDICINE

## 2022-03-16 PROCEDURE — 1036F TOBACCO NON-USER: CPT | Performed by: INTERNAL MEDICINE

## 2022-03-16 PROCEDURE — 99213 OFFICE O/P EST LOW 20 MIN: CPT | Performed by: INTERNAL MEDICINE

## 2022-03-16 PROCEDURE — G8427 DOCREV CUR MEDS BY ELIG CLIN: HCPCS | Performed by: INTERNAL MEDICINE

## 2022-03-16 PROCEDURE — 96372 THER/PROPH/DIAG INJ SC/IM: CPT | Performed by: INTERNAL MEDICINE

## 2022-03-16 RX ORDER — TESTOSTERONE CYPIONATE 200 MG/ML
200 INJECTION INTRAMUSCULAR ONCE
Status: COMPLETED | OUTPATIENT
Start: 2022-03-16 | End: 2022-03-16

## 2022-03-16 RX ADMIN — TESTOSTERONE CYPIONATE 200 MG: 200 INJECTION INTRAMUSCULAR at 14:38

## 2022-03-16 NOTE — PROGRESS NOTES
Patient given Testosterone 200mg IM left gluteal..  Will return in 2 weeks for next injection    Patient tolerated injection well.     Administrations This Visit     testosterone cypionate (DEPOTESTOTERONE CYPIONATE) injection 200 mg     Admin Date  03/16/2022 Action  Given Dose  200 mg Route  Intra[facility] Administered By  Seth Macias LPN

## 2022-03-16 NOTE — PROGRESS NOTES
3/16/2022    Assessment:       Diagnosis Orders   1. Hypogonadism in male           PLAN:     Orders Placed This Encounter   Procedures    Testosterone, free, total     Standing Status:   Future     Standing Expiration Date:   3/16/2023     OARRS report reviewed    Continue testosterone 200 mg every 2 weeks  Follow-up in 6 months  Repeat labs    Subjective:     Chief Complaint   Patient presents with    Hypogonadism     Vitals:    03/16/22 1439   BP: 99/64   Pulse: 59   SpO2: 97%   Weight: (!) 315 lb (142.9 kg)     Wt Readings from Last 3 Encounters:   03/16/22 (!) 315 lb (142.9 kg)   03/08/22 (!) 312 lb (141.5 kg)   11/19/21 (!) 305 lb (138.3 kg)     BP Readings from Last 3 Encounters:   03/16/22 99/64   03/08/22 134/88   11/19/21 116/60     Follow-up on hypogonadism secondary to obesity history of sleep apnea last testosterone done in September it was over 90 no recent labs to review patient getting testosterone injections through the clinic  No new symptoms to report    Other  This is a recurrent (Hypogonadism) problem. The current episode started more than 1 year ago. The problem occurs intermittently. The problem has been unchanged. Associated symptoms include fatigue. Exacerbated by: Obesity. Treatments tried: Testosterone injections. The treatment provided moderate relief.      Past Medical History:   Diagnosis Date    Hypogonadism in male     Sleep apnea      Past Surgical History:   Procedure Laterality Date    [facility]OMY  2020    [facility]     Social History     Socioeconomic History    Marital status:      Spouse name: Not on file    Number of children: Not on file    Years of education: Not on file    Highest education level: Not on file   Occupational History    Not on file   Tobacco Use    Smoking status: Never Smoker    Smokeless tobacco: Never Used   Vaping Use    Vaping Use: Not on file   Substance and Sexual Activity    Alcohol use: Yes     Comment: socially    Drug use: Never    Sexual activity: Not on file   Other Topics Concern    Not on file   Social History Narrative    Not on file     Social Determinants of Health     Financial Resource Strain: Low Risk     Difficulty of Paying Living Expenses: Not hard at all   Food Insecurity: No Food Insecurity    Worried About Running Out of Food in the Last Year: Never true    [address] in the Last Year: Never true   Transportation Needs:     Lack of Transportation (Medical): Not on file    Lack of Transportation (Non-Medical): Not on file   Physical Activity:     Days of Exercise per Week: Not on file    Minutes of Exercise per Session: Not on file   Stress:     Feeling of Stress : Not on file   Social Connections:     Frequency of Communication with Friends and Family: Not on file    Frequency of Social Gatherings with Friends and Family: Not on file    Attends Gnosticism Services: Not on file    Active Member of [address] [place] [address] [employer] or Organizations: Not on file    Attends Club or Organization Meetings: Not on file    Marital Status: Not on file   Intimate Partner Violence:     Fear of Current or Ex-Partner: Not on file    Emotionally Abused: Not on file    Physically Abused: Not on file    Sexually Abused: Not on file   Housing Stability:     Unable to Pay for Housing in the Last Year: Not on file    Number of [place] in the Last Year: Not on file    Unstable Housing in the Last Year: Not on file     Family History   Problem Relation Age of Onset    Heart Disease Father         a fib/chf    Other Maternal Grandmother         no fh of aneurysms    Cancer Maternal Grandfather         no fdr or sdr with cancer other than aunt pancreatic cancer. (MATERNAL)    Other Paternal Grandfather         no congenital heart disease in [facility]. No Known Allergies    Current Outpatient Medications:     triamcinolone (KENALOG) 0.025 % cream, Apply topically 2 times daily. , Disp: 1 Tube, Rfl: 0    SYRINGE-NEEDLE, DISP, 3 ML (B-D INTEGRA SYRINGE) 22G X 1-1/2\" 3 ML MISC, 1 each by Does not apply route daily, Disp: 20 each, Rfl: 6    fexofenadine (ALLEGRA) 60 MG tablet, Take 60 mg by mouth, Disp: , Rfl:     meloxicam (MOBIC) 7.5 MG tablet, Take 1 tablet by mouth daily Take with food, avoid other NSAIDs (Ibuprofen) and steroids, Disp: 30 tablet, Rfl: 0    gabapentin (NEURONTIN) 300 MG capsule, Take 1 capsule by mouth nightly for 30 days. , Disp: 30 capsule, Rfl: 0    testosterone cypionate (DEPOTESTOTERONE CYPIONATE) 200 MG/ML injection, Inject 1mL Q 2 weeks, Disp: 10 mL, Rfl: 1  Lab Results   Component Value Date     07/19/2021    K 4.3 07/19/2021     07/19/2021    CO2 24 07/19/2021    BUN 11 07/19/2021    CREATININE 0.89 07/19/2021    GLUCOSE 88 07/19/2021    CALCIUM 9.5 07/19/2021    PROT 7.2 07/19/2021    LABALBU 4.7 (H) 07/19/2021    BILITOT 0.4 07/19/2021    ALKPHOS 66 07/19/2021    AST 22 07/19/2021    ALT 25 07/19/2021    LABGLOM >60.0 07/19/2021    GFRAA >60.0 07/19/2021    GLOB 2.5 07/19/2021     Lab Results   Component Value Date    WBC 7.6 07/19/2021    HGB 15.4 07/19/2021    HCT 45.6 07/19/2021    MCV 87.6 07/19/2021     07/19/2021     Lab Results   Component Value Date    LABA1C 5.6 07/19/2021    LABA1C 5.4 11/19/2018     Lab Results   Component Value Date    CHOLFAST 179 07/19/2021    TRIGLYCFAST 278 (H) 07/19/2021    HDL 29 (L) 07/19/2021    LDLCALC 94 07/19/2021     Lab Results   Component Value Date    TESTM 490 09/01/2021    TESTM 414 02/11/2021    TESTM 406 07/30/2020     Lab Results   Component Value Date    TSHREFLEX 1.440 07/19/2021    TSHREFLEX 1.470 01/27/2020    T4FREE 1.15 01/27/2020     Lab Results   Component Value Date    TPOABS <10.0 01/29/2020       Review of Systems   Constitutional: Positive for fatigue. Endocrine: Negative. Genitourinary: Negative. Psychiatric/Behavioral: Positive for sleep disturbance. All other systems reviewed and are negative.       Objective:   Physical Exam  Vitals reviewed. Constitutional:       Appearance: Normal appearance. He is obese. HENT:      Head: Normocephalic and atraumatic. Right Ear: External ear normal.      Left Ear: External ear normal.      Nose: Nose normal.   Eyes:      General: No scleral icterus. Right eye: No discharge. Left eye: No discharge. Extraocular Movements: Extraocular movements intact. Conjunctiva/sclera: Conjunctivae normal.   Cardiovascular:      Rate and Rhythm: Normal rate. Pulmonary:      Effort: Pulmonary effort is normal.   Musculoskeletal:         General: Normal range of motion. Cervical back: Normal range of motion and neck supple. Neurological:      General: No focal deficit present. Mental Status: He is alert and oriented to person, place, and time.    Psychiatric:         Mood and Affect: Mood normal.         Behavior: Behavior normal.

## 2022-03-30 ENCOUNTER — NURSE ONLY (OUTPATIENT)
Dept: ENDOCRINOLOGY | Age: 32
End: 2022-03-30
Payer: COMMERCIAL

## 2022-03-30 DIAGNOSIS — E29.1 HYPOGONADISM IN MALE: Primary | ICD-10-CM

## 2022-03-30 PROCEDURE — 96372 THER/PROPH/DIAG INJ SC/IM: CPT | Performed by: INTERNAL MEDICINE

## 2022-03-30 RX ORDER — TESTOSTERONE CYPIONATE 200 MG/ML
200 INJECTION INTRAMUSCULAR ONCE
Status: COMPLETED | OUTPATIENT
Start: 2022-03-30 | End: 2022-03-30

## 2022-03-30 RX ADMIN — TESTOSTERONE CYPIONATE 200 MG: 200 INJECTION INTRAMUSCULAR at 14:47

## 2022-03-30 NOTE — PROGRESS NOTES
Patient given Testosterone 200mg IM right gluteal..  Will return in 2 weeks for next injection    Patient tolerated injection well.     Administrations This Visit     testosterone cypionate (DEPOTESTOTERONE CYPIONATE) injection 200 mg     Admin Date  03/30/2022 Action  Given Dose  200 mg Route  Intra[facility] Administered By  Charlesetta Baumgarten, LPN Will check with Dr. Sanchez if it is ok to switch to [place]

## 2022-04-12 ENCOUNTER — NURSE ONLY (OUTPATIENT)
Dept: ENDOCRINOLOGY | Age: 32
End: 2022-04-12
Payer: COMMERCIAL

## 2022-04-12 DIAGNOSIS — E29.1 HYPOGONADISM IN MALE: Primary | ICD-10-CM

## 2022-04-12 PROCEDURE — 96372 THER/PROPH/DIAG INJ SC/IM: CPT | Performed by: INTERNAL MEDICINE

## 2022-04-12 RX ORDER — TESTOSTERONE CYPIONATE 200 MG/ML
200 INJECTION INTRAMUSCULAR ONCE
Status: COMPLETED | OUTPATIENT
Start: 2022-04-12 | End: 2022-04-12

## 2022-04-12 RX ADMIN — TESTOSTERONE CYPIONATE 200 MG: 200 INJECTION INTRAMUSCULAR at 14:45

## 2022-04-12 NOTE — PROGRESS NOTES
Patient given Testosterone 200mg IM left gluteal..  Will return in 2 weeks for next injection    Patient tolerated injection well.     Administrations This Visit     testosterone cypionate (DEPOTESTOTERONE CYPIONATE) injection 200 mg     Admin Date  04/12/2022 Action  Given Dose  200 mg Route  IntraM[facility]ular Administered By  Vishnu Ji LPN

## 2022-04-21 ENCOUNTER — HOSPITAL ENCOUNTER (OUTPATIENT)
Dept: SLEEP CENTER | Age: 32
Discharge: HOME | End: 2022-04-23
Payer: COMMERCIAL

## 2022-04-21 PROCEDURE — 95810 POLYSOM 6/> YRS 4/> PARAM: CPT

## 2022-04-25 PROCEDURE — 95810 POLYSOM 6/> YRS 4/> PARAM: CPT | Performed by: INTERNAL MEDICINE

## 2022-04-27 ENCOUNTER — NURSE ONLY (OUTPATIENT)
Dept: ENDOCRINOLOGY | Age: 32
End: 2022-04-27
Payer: COMMERCIAL

## 2022-04-27 DIAGNOSIS — E29.1 HYPOGONADISM IN MALE: Primary | ICD-10-CM

## 2022-04-27 PROBLEM — G47.33 OSA (OBSTRUCTIVE SLEEP APNEA): Status: ACTIVE | Noted: 2022-03-08

## 2022-04-27 PROCEDURE — 96372 THER/PROPH/DIAG INJ SC/IM: CPT | Performed by: INTERNAL MEDICINE

## 2022-04-27 RX ORDER — TESTOSTERONE CYPIONATE 200 MG/ML
200 INJECTION INTRAMUSCULAR ONCE
Status: COMPLETED | OUTPATIENT
Start: 2022-04-27 | End: 2022-04-27

## 2022-04-27 RX ADMIN — TESTOSTERONE CYPIONATE 200 MG: 200 INJECTION INTRAMUSCULAR at 15:02

## 2022-04-27 NOTE — PROGRESS NOTES
Patient given Testosterone 200mg IM right gluteal..  Will return in 2 weeks for next injection    Patient tolerated injection well.     Administrations This Visit     testosterone cypionate (DEPOTESTOTERONE CYPIONATE) injection 200 mg     Admin Date  04/27/2022 Action  Given Dose  200 mg Route  IntraM[facility]Cular Administered By  Emilee Bailey LPN

## 2022-05-04 ENCOUNTER — TELEMEDICINE (OUTPATIENT)
Dept: PULMONOLOGY | Age: 32
End: 2022-05-04
Payer: COMMERCIAL

## 2022-05-04 DIAGNOSIS — G47.33 OSA (OBSTRUCTIVE SLEEP APNEA): Primary | ICD-10-CM

## 2022-05-04 DIAGNOSIS — E66.01 MORBID OBESITY (HCC): ICD-10-CM

## 2022-05-04 PROCEDURE — G8428 CUR MEDS NOT DOCUMENT: HCPCS | Performed by: INTERNAL MEDICINE

## 2022-05-04 PROCEDURE — 99214 OFFICE O/P EST MOD 30 MIN: CPT | Performed by: INTERNAL MEDICINE

## 2022-05-04 ASSESSMENT — ENCOUNTER SYMPTOMS
VOICE CHANGE: 0
ABDOMINAL PAIN: 0
NAUSEA: 0
WHEEZING: 0
RHINORRHEA: 0
EYE ITCHING: 0
SORE THROAT: 0
COUGH: 0
VOMITING: 0
DIARRHEA: 0
SHORTNESS OF BREATH: 0
CHEST TIGHTNESS: 0

## 2022-05-04 NOTE — PROGRESS NOTES
2022    TELEHEALTH EVALUATION -- Audio/Visual (During LNDGI-90 public health emergency)    HPI:    Ellie Jackson (:  1990) has requested an audio/video evaluation for the following concern(s):    He had PSG study done on 22 AHI 44.7 REM 84.1 , low O2 sat below 89% for 30 min   He is complaining of snoring and daytime sleepiness and tiredness. Not sure about C/o witness apnea. He wakes up 3 times during sleep   He does not have restful sleep. He does not take daily naps. He does fall asleep while watching TV. He does  have a complaint of sleepiness while driving only long distance     Review of Systems   Constitutional: Negative for chills, diaphoresis, fatigue and fever. HENT: Negative for congestion, mouth sores, nosebleeds, postnasal drip, rhinorrhea, sneezing, sore throat and voice change. Eyes: Negative for itching and visual disturbance. Respiratory: Negative for cough, chest tightness, shortness of breath and wheezing. Cardiovascular: Negative. Negative for chest pain, palpitations and leg swelling. Gastrointestinal: Negative for abdominal pain, diarrhea, nausea and vomiting. Genitourinary: Negative for difficulty urinating and hematuria. Musculoskeletal: Negative for arthralgias, joint swelling and myalgias. Skin: Negative for rash. Allergic/Immunologic: Negative for environmental allergies. Neurological: Negative for dizziness, tremors, weakness and headaches. Psychiatric/Behavioral: Positive for sleep disturbance. Negative for behavioral problems. Prior to Visit Medications    Medication Sig Taking? Authorizing Provider   meloxicam (MOBIC) 7.5 MG tablet Take 1 tablet by mouth daily Take with food, avoid other NSAIDs (Ibuprofen) and steroids  DAVY Garcia - CNP   gabapentin (NEURONTIN) 300 MG capsule Take 1 capsule by mouth nightly for 30 days.   DAVY Garcia - CNP   testosterone cypionate (DEPOTESTOTERONE CYPIONATE) 200 MG/ML injection Inject 1mL Q 2 weeks  Patient taking differently: Indications: OV note order 3/16/22 Inject 1mL Q 2 weeks  Savita Dey MD   triamcinolone (KENALOG) 0.025 % cream Apply topically 2 times daily. Savita Dey MD   SYRINGE-NEEDLE, DISP, 3 ML (B-D INTEGRA SYRINGE) 22G X 1-1/2\" 3 ML MISC 1 each by Does not apply route daily  Savita Dey MD   fexofenadine (ALLEGRA) 60 MG tablet Take 60 mg by mouth  Historical Provider, MD       Social History     Tobacco Use    Smoking status: Never Smoker    Smokeless tobacco: Never Used   Vaping Use    Vaping Use: Not on file   Substance Use Topics    Alcohol use: Yes     Comment: socially    Drug use: Never        No Known Allergies    PHYSICAL EXAMINATION:  [ INSTRUCTIONS:  \"[x]\" Indicates a positive item  \"[]\" Indicates a negative item  -- DELETE ALL ITEMS NOT EXAMINED]  Vital Signs: (As obtained by patient/caregiver or practitioner observation)    Blood pressure-  Heart rate-    Respiratory rate-    Temperature-  Pulse oximetry-     Constitutional: [x] Appears well-developed and well-nourished [x] No apparent distress      [] Abnormal-   Mental status  [x] Alert and awake  [x] Oriented to person/place/time [x]Able to follow commands      Eyes:  EOM    [x]  Normal  [] Abnormal-  Sclera  []  Normal  [] Abnormal -         Discharge []  None visible  [] Abnormal -    HENT:   [x] Normocephalic, atraumatic.   [] Abnormal   [] Mouth/Throat: Mucous membranes are moist.     External Ears [] Normal  [] Abnormal-     Neck: [x] No visualized mass     Pulmonary/Chest: [x] Respiratory effort normal.  [x] No visualized signs of difficulty breathing or respiratory distress        [] Abnormal-      Musculoskeletal:   [x] Normal gait with no signs of ataxia         [] Normal range of motion of neck        [] Abnormal-       Neurological:        [x] No Facial Asymmetry (Cranial nerve 7 motor function) (limited exam to video visit)          [] No gaze palsy        [] Abnormal-         Skin: [x] No significant exanthematous lesions or discoloration noted on facial skin         [] Abnormal-            Psychiatric:       [x] Normal Affect [x] No Hallucinations        [] Abnormal-     Other pertinent observable physical exam findings-     ASSESSMENT/PLAN:  1. SHANDA (obstructive sleep apnea)  He had PSG study done on 4/21/22 AHI 44.7 REM 84.1 , low O2 sat below 89% for 30 min   He is complaining of snoring and daytime sleepiness and tiredness. He wakes up 3 times during sleep He does not have restful sleep. He does not take daily naps. He does fall asleep while watching TV. He does  have a complaint of sleepiness while driving only long distance     - CPAP study requested     Counseling: CPAP/BiPAP uses, He advised to use CPAP at least 5-6 hours every night. Driving: He is advised for extreme caution when driving or operating machinery if there is a feeling of drowsiness, especially while driving it is preferable to stop driving and take a brief nap. Sleep hygiene:Avoid supine sleep, sleep on  sides. Avoid  sleep deprivation. Explained sleep hygiene. Advice to avoid Alcohol and sedative    2. Morbid obesity ([address]  He is advised try to lose weight. obesity related risk explained to the patient , Suggested weight control approaches, including dietary changes , exercise, behavioral modification. Return in about 3 weeks (around 5/25/2022) for shanda. Arlette Silva, was evaluated through a synchronous (real-time) audio-video encounter. The patient (or guardian if applicable) is aware that this is a billable service, which includes applicable co-pays. This Virtual Visit was conducted with patient's (and/or legal guardian's) consent. The visit was conducted pursuant to the emergency declaration under the [facility], [address] [place] [address] authority and the [employer] General Act.   Patient identification was verified, and a caregiver was present when appropriate. The patient was located at home in a state where the provider was licensed to provide care. --Sathish Slater MD on 5/4/2022 at 2:27 PM    An electronic signature was used to authenticate this note.

## 2022-05-24 DIAGNOSIS — E29.1 HYPOGONADISM IN MALE: ICD-10-CM

## 2022-05-25 ENCOUNTER — NURSE ONLY (OUTPATIENT)
Dept: ENDOCRINOLOGY | Age: 32
End: 2022-05-25
Payer: COMMERCIAL

## 2022-05-25 DIAGNOSIS — E29.1 HYPOGONADISM IN MALE: Primary | ICD-10-CM

## 2022-05-25 LAB
SEX HORMONE BINDING GLOBULIN: 8 NMOL/L (ref 11–80)
TESTOSTERONE FREE-NONMALE: 10.9 PG/ML (ref 47–244)
TESTOSTERONE TOTAL: 34 NG/DL (ref 220–1000)

## 2022-05-25 PROCEDURE — 96372 THER/PROPH/DIAG INJ SC/IM: CPT | Performed by: INTERNAL MEDICINE

## 2022-05-25 RX ORDER — TESTOSTERONE CYPIONATE 200 MG/ML
200 INJECTION INTRAMUSCULAR ONCE
Status: COMPLETED | OUTPATIENT
Start: 2022-05-25 | End: 2022-05-25

## 2022-05-25 RX ADMIN — TESTOSTERONE CYPIONATE 200 MG: 200 INJECTION INTRAMUSCULAR at 14:31

## 2022-05-25 NOTE — PROGRESS NOTES
Patient given Testosterone 200mg IM left gluteal..  Will return in 2 weeks for next injection    Patient tolerated injection well.     Administrations This Visit     testosterone cypionate (DEPOTESTOTERONE CYPIONATE) injection 200 mg     Admin Date  05/25/2022 Action  Given Dose  200 mg Route  Intra[facility]ular Administered By  April Austin LPN

## 2022-06-15 ENCOUNTER — NURSE ONLY (OUTPATIENT)
Dept: ENDOCRINOLOGY | Age: 32
End: 2022-06-15
Payer: COMMERCIAL

## 2022-06-15 DIAGNOSIS — E29.1 HYPOGONADISM IN MALE: Primary | ICD-10-CM

## 2022-06-15 PROCEDURE — 96372 THER/PROPH/DIAG INJ SC/IM: CPT | Performed by: INTERNAL MEDICINE

## 2022-06-15 RX ORDER — TESTOSTERONE CYPIONATE 200 MG/ML
200 INJECTION INTRAMUSCULAR ONCE
Status: COMPLETED | OUTPATIENT
Start: 2022-06-15 | End: 2022-06-15

## 2022-06-15 RX ADMIN — TESTOSTERONE CYPIONATE 200 MG: 200 INJECTION INTRAMUSCULAR at 15:29

## 2022-06-15 NOTE — PROGRESS NOTES
Patient given Testosterone 200mg IM right gluteal..  Will return in 2 weeks for next injection    Patient tolerated injection well.     Administrations This Visit     testosterone cypionate (DEPOTESTOTERONE CYPIONATE) injection 200 mg     Admin Date  06/15/2022 Action  Given Dose  200 mg Route  IntraMUSCular Administered By  Uday Colon LPN

## 2022-06-22 ENCOUNTER — HOSPITAL ENCOUNTER (OUTPATIENT)
Dept: SLEEP CENTER | Age: 32
Discharge: HOME | End: 2022-06-24
Payer: COMMERCIAL

## 2022-06-22 PROCEDURE — 95811 POLYSOM 6/>YRS CPAP 4/> PARM: CPT

## 2022-06-29 ENCOUNTER — NURSE ONLY (OUTPATIENT)
Dept: ENDOCRINOLOGY | Age: 32
End: 2022-06-29
Payer: COMMERCIAL

## 2022-06-29 DIAGNOSIS — E29.1 HYPOGONADISM IN MALE: Primary | ICD-10-CM

## 2022-06-29 PROCEDURE — 96372 THER/PROPH/DIAG INJ SC/IM: CPT | Performed by: INTERNAL MEDICINE

## 2022-06-29 RX ORDER — TESTOSTERONE CYPIONATE 200 MG/ML
200 INJECTION INTRAMUSCULAR ONCE
Status: COMPLETED | OUTPATIENT
Start: 2022-06-29 | End: 2022-06-29

## 2022-06-29 RX ADMIN — TESTOSTERONE CYPIONATE 200 MG: 200 INJECTION INTRAMUSCULAR at 14:42

## 2022-06-29 NOTE — PROGRESS NOTES
Patient given Testosterone 200mg IM left gluteal..  Will return in 2 weeks for next injection    Patient tolerated injection well.     Administrations This Visit     testosterone cypionate (DEPOTESTOTERONE CYPIONATE) injection 200 mg     Admin Date  06/29/2022 Action  Given Dose  200 mg Route  IntraMUSCular Administered By  Jian Elliott LPN

## 2022-07-02 PROCEDURE — 95811 POLYSOM 6/>YRS CPAP 4/> PARM: CPT | Performed by: INTERNAL MEDICINE

## 2022-07-19 ENCOUNTER — OFFICE VISIT (OUTPATIENT)
Dept: PULMONOLOGY | Age: 32
End: 2022-07-19
Payer: COMMERCIAL

## 2022-07-19 VITALS
DIASTOLIC BLOOD PRESSURE: 62 MMHG | HEIGHT: 70 IN | SYSTOLIC BLOOD PRESSURE: 100 MMHG | WEIGHT: 303 LBS | HEART RATE: 51 BPM | BODY MASS INDEX: 43.38 KG/M2 | OXYGEN SATURATION: 98 %

## 2022-07-19 DIAGNOSIS — E66.01 MORBID OBESITY (HCC): ICD-10-CM

## 2022-07-19 DIAGNOSIS — G47.33 OSA (OBSTRUCTIVE SLEEP APNEA): Primary | ICD-10-CM

## 2022-07-19 PROCEDURE — 99214 OFFICE O/P EST MOD 30 MIN: CPT | Performed by: INTERNAL MEDICINE

## 2022-07-19 PROCEDURE — G8417 CALC BMI ABV UP PARAM F/U: HCPCS | Performed by: INTERNAL MEDICINE

## 2022-07-19 PROCEDURE — 1036F TOBACCO NON-USER: CPT | Performed by: INTERNAL MEDICINE

## 2022-07-19 PROCEDURE — G8427 DOCREV CUR MEDS BY ELIG CLIN: HCPCS | Performed by: INTERNAL MEDICINE

## 2022-07-19 ASSESSMENT — ENCOUNTER SYMPTOMS
ABDOMINAL PAIN: 0
SORE THROAT: 0
WHEEZING: 0
COUGH: 0
DIARRHEA: 0
CHEST TIGHTNESS: 0
RHINORRHEA: 0
SHORTNESS OF BREATH: 0
EYE ITCHING: 0
VOICE CHANGE: 0
VOMITING: 0
NAUSEA: 0

## 2022-07-19 NOTE — PROGRESS NOTES
Subjective:     Stefani Orourke is a 27 y.o. male who complains today of:     Chief Complaint   Patient presents with    Results     SS results       HPI  He had PSG study done on 4/21/22 AHI 44.7 REM 84.1 , low O2 sat below 89% for 30 min . CPAP titration study  shows therapeutic at 11 cm . He sleep better with CPAP. He is complaining of snoring and daytime sleepiness and tiredness. He wakes up 3 times during sleep   He does not have restful sleep. He does not take daily naps. He does fall asleep while watching TV. He does  have a complaint of sleepiness while driving only long distance. Allergies:  Patient has no known allergies. Past Medical History:   Diagnosis Date    Hypogonadism in male     Sleep apnea      Past Surgical History:   Procedure Laterality Date    VASECTOMY  2020    [facility]     Family History   Problem Relation Age of Onset    Heart Disease Father         a fib/chf    Other Maternal Grandmother         no fh of aneurysms    Cancer Maternal Grandfather         no fdr or sdr with cancer other than aunt pancreatic cancer. (MATERNAL)    Other Paternal Grandfather         no congenital heart disease in [facility].      Social History     Socioeconomic History    Marital status:      Spouse name: Not on file    Number of children: Not on file    Years of education: Not on file    Highest education level: Not on file   Occupational History    Not on file   Tobacco Use    Smoking status: Never    Smokeless tobacco: Never   Vaping Use    Vaping Use: Not on file   Substance and Sexual Activity    Alcohol use: Yes     Comment: socially    Drug use: Never    Sexual activity: Not on file   Other Topics Concern    Not on file   Social History Narrative    Not on file     Social Determinants of Health     Financial Resource Strain: Low Risk     Difficulty of Paying Living Expenses: Not hard at all   Food Insecurity: No Food Insecurity    Worried About [address] in the Last Year: Never true Ran Out of Food in the Last Year: Never true   Transportation Needs: Not on file   Physical Activity: Not on file   Stress: Not on file   Social Connections: Not on file   Intimate Partner Violence: Not on file   Housing Stability: Not on file         Review of Systems   Constitutional:  Negative for chills, diaphoresis, fatigue and fever. HENT:  Negative for congestion, mouth sores, nosebleeds, postnasal drip, rhinorrhea, sneezing, sore throat and voice change. Snoring   Eyes:  Negative for itching and visual disturbance. Respiratory:  Negative for cough, chest tightness, shortness of breath and wheezing. Cardiovascular: Negative. Negative for chest pain, palpitations and leg swelling. Gastrointestinal:  Negative for abdominal pain, diarrhea, nausea and vomiting. Genitourinary:  Negative for difficulty urinating and hematuria. Musculoskeletal:  Negative for arthralgias, joint swelling and myalgias. Skin:  Negative for rash. Allergic/Immunologic: Negative for environmental allergies. Neurological:  Negative for dizziness, tremors, weakness and headaches. Psychiatric/Behavioral:  Positive for sleep disturbance. Negative for behavioral problems. :     Vitals:    07/19/22 0843   BP: 100/62   Site: Right Upper Arm   Position: Sitting   Cuff Size: Medium Adult   Pulse: 51   SpO2: 98%   Weight: (!) 303 lb (137.4 kg)   Height: 5' 10\" (1.778 m)     Wt Readings from Last 3 Encounters:   07/19/22 (!) 303 lb (137.4 kg)   03/16/22 (!) 315 lb (142.9 kg)   03/08/22 (!) 312 lb (141.5 kg)         Physical Exam  Constitutional:       Appearance: He is well-developed. He is obese. HENT:      Head: Normocephalic and atraumatic. Nose: Nose normal.   Eyes:      Conjunctiva/sclera: Conjunctivae normal.      Pupils: Pupils are equal, round, and reactive to light. Neck:      Thyroid: No thyromegaly. Vascular: No JVD. Trachea: No tracheal deviation.    Cardiovascular:      Rate and Rhythm: Normal rate and regular rhythm. Heart sounds: No murmur heard. No friction rub. No gallop. Pulmonary:      Effort: Pulmonary effort is normal. No respiratory distress. Breath sounds: Normal breath sounds. No wheezing or rales. Chest:      Chest wall: No tenderness. Abdominal:      General: There is no distension. Musculoskeletal:         General: Normal range of motion. Lymphadenopathy:      Cervical: No cervical adenopathy. Skin:     General: Skin is warm and dry. Findings: No rash. Neurological:      Mental Status: He is alert and oriented to person, place, and time. Cranial Nerves: No cranial nerve deficit. Psychiatric:         Behavior: Behavior normal.       Current Outpatient Medications   Medication Sig Dispense Refill    CPAP Machine MISC by Does not apply route New CPAP with 11 cm of H2O    Dx. Sever SHANDA . Please give ASAP ,AHI 44.7 1 each 0    testosterone cypionate (DEPOTESTOTERONE CYPIONATE) 200 MG/ML injection Inject 1mL Q 2 weeks (Patient taking differently: Indications: OV note order 3/16/22 Inject 1mL Q 2 weeks) 10 mL 1    triamcinolone (KENALOG) 0.025 % cream Apply topically 2 times daily. 1 Tube 0    SYRINGE-NEEDLE, DISP, 3 ML (B-D INTEGRA SYRINGE) 22G X 1-1/2\" 3 ML MISC 1 each by Does not apply route daily 20 each 6    fexofenadine (ALLEGRA) 60 MG tablet Take 60 mg by mouth       No current facility-administered medications for this visit. Results for orders placed in visit on 07/19/21    XR CHEST (2 VW)    Narrative  Exam: XR CHEST (2 VW)    CLINICAL HISTORY: R07.89 Chest tightness ICD10    COMPARISON: None    CHEST X-ray, 2 VIEWS  FINDINGS:      The cardiomediastinal silhouette is within normal limits. There are no infiltrates, consolidations or effusions. Bones of the thorax appear intact. Impression  No radiographic evidence of acute intrathoracic process.       Assessment/Plan:     1. SHANDA (obstructive sleep apnea)  He had PSG study done on 4/21/22 AHI 44.7 REM 84.1 , low O2 sat below 89% for 30 min . CPAP titration study  shows therapeutic at 11 cm . He sleep better with CPAP. CPAP study reviewed with  patient He is complaining of snoring and daytime sleepiness and tiredness. He wakes up 3 times during sleep . He does not have restful sleep. He does not take daily naps. He does fall asleep while watching TV. He does  have a complaint of sleepiness while driving only long distance. He will be started on CPAP  with 11 cm .    -New CPAP with 11 cm     Counseling: CPAP/BiPAP uses, He advised to use CPAP at least 5-6 hours every night. Driving: He is advised for extreme caution when driving or operating machinery if there is a feeling of drowsiness, especially while driving it is preferable to stop driving and take a brief nap. Sleep hygiene:Avoid supine sleep, sleep on  sides. Avoid  sleep deprivation. Explained sleep hygiene. Advice to avoid Alcohol and sedative       2. Morbid obesity ([address]  He is advised try to lose weight. obesity related risk explained to the patient ,  Current weight:  (!) 303 lb (137.4 kg) Lbs. BMI:  Body mass index is 43.48 kg/m². Suggested weight control approaches, including dietary changes , exercise, behavioral modification. Lost 12 lbs from last visit     Return in about 2 months (around 9/19/2022) for oscar.       Kate Galloway MD

## 2022-07-20 ENCOUNTER — NURSE ONLY (OUTPATIENT)
Dept: ENDOCRINOLOGY | Age: 32
End: 2022-07-20
Payer: COMMERCIAL

## 2022-07-20 DIAGNOSIS — E29.1 HYPOGONADISM IN MALE: Primary | ICD-10-CM

## 2022-07-20 PROCEDURE — 96372 THER/PROPH/DIAG INJ SC/IM: CPT | Performed by: INTERNAL MEDICINE

## 2022-07-20 RX ORDER — TESTOSTERONE CYPIONATE 200 MG/ML
200 INJECTION INTRAMUSCULAR ONCE
Status: COMPLETED | OUTPATIENT
Start: 2022-07-20 | End: 2022-07-20

## 2022-07-20 RX ADMIN — TESTOSTERONE CYPIONATE 200 MG: 200 INJECTION INTRAMUSCULAR at 14:55

## 2022-07-20 NOTE — PROGRESS NOTES
Patient given Testosterone 200mg IM right gluteal..  Will return in 2 weeks for next injection    Patient tolerated injection well.     Administrations This Visit       testosterone cypionate (DEPOTESTOTERONE CYPIONATE) injection 200 mg       Admin Date  07/20/2022 Action  Given Dose  200 mg Route  [facility] Administered By  Leslie Henriquez LPN

## 2022-07-29 ENCOUNTER — OFFICE VISIT (OUTPATIENT)
Dept: FAMILY MEDICINE CLINIC | Age: 32
End: 2022-07-29
Payer: COMMERCIAL

## 2022-07-29 VITALS
OXYGEN SATURATION: 97 % | HEART RATE: 54 BPM | DIASTOLIC BLOOD PRESSURE: 80 MMHG | BODY MASS INDEX: 43.52 KG/M2 | HEIGHT: 70 IN | SYSTOLIC BLOOD PRESSURE: 140 MMHG | TEMPERATURE: 96.8 F | WEIGHT: 304 LBS

## 2022-07-29 DIAGNOSIS — R81 GLUCOSURIA: ICD-10-CM

## 2022-07-29 DIAGNOSIS — R30.0 DYSURIA: Primary | ICD-10-CM

## 2022-07-29 DIAGNOSIS — R30.0 DYSURIA: ICD-10-CM

## 2022-07-29 LAB
BILIRUBIN, POC: NORMAL
BLOOD URINE, POC: NORMAL
CLARITY, POC: CLEAR
COLOR, POC: YELLOW
GLUCOSE URINE, POC: 5
KETONES, POC: NORMAL
LEUKOCYTE EST, POC: NORMAL
NITRITE, POC: NORMAL
PH, POC: 7
PROTEIN, POC: 0.15
SPECIFIC GRAVITY, POC: 1.02
UROBILINOGEN, POC: 35

## 2022-07-29 PROCEDURE — 99213 OFFICE O/P EST LOW 20 MIN: CPT | Performed by: PHYSICIAN ASSISTANT

## 2022-07-29 PROCEDURE — G8417 CALC BMI ABV UP PARAM F/U: HCPCS | Performed by: PHYSICIAN ASSISTANT

## 2022-07-29 PROCEDURE — G8427 DOCREV CUR MEDS BY ELIG CLIN: HCPCS | Performed by: PHYSICIAN ASSISTANT

## 2022-07-29 PROCEDURE — 81002 URINALYSIS NONAUTO W/O SCOPE: CPT | Performed by: PHYSICIAN ASSISTANT

## 2022-07-29 PROCEDURE — 1036F TOBACCO NON-USER: CPT | Performed by: PHYSICIAN ASSISTANT

## 2022-07-29 SDOH — ECONOMIC STABILITY: FOOD INSECURITY: WITHIN THE PAST 12 MONTHS, THE FOOD YOU BOUGHT JUST DIDN'T LAST AND YOU DIDN'T HAVE MONEY TO GET MORE.: NEVER TRUE

## 2022-07-29 SDOH — ECONOMIC STABILITY: FOOD INSECURITY: WITHIN THE PAST 12 MONTHS, YOU WORRIED THAT YOUR FOOD WOULD RUN OUT BEFORE YOU GOT MONEY TO BUY MORE.: NEVER TRUE

## 2022-07-29 ASSESSMENT — ENCOUNTER SYMPTOMS
SHORTNESS OF BREATH: 0
BACK PAIN: 0
SINUS PRESSURE: 0
SINUS PAIN: 0
NAUSEA: 0
DIARRHEA: 0
CHEST TIGHTNESS: 0
COUGH: 0
SORE THROAT: 0
VOMITING: 0
ABDOMINAL PAIN: 0

## 2022-07-29 ASSESSMENT — VISUAL ACUITY: OU: 1

## 2022-07-29 ASSESSMENT — SOCIAL DETERMINANTS OF HEALTH (SDOH): HOW HARD IS IT FOR YOU TO PAY FOR THE VERY BASICS LIKE FOOD, HOUSING, MEDICAL CARE, AND HEATING?: NOT HARD AT ALL

## 2022-07-29 ASSESSMENT — PATIENT HEALTH QUESTIONNAIRE - PHQ9
2. FEELING DOWN, DEPRESSED OR HOPELESS: 0
SUM OF ALL RESPONSES TO PHQ9 QUESTIONS 1 & 2: 0
SUM OF ALL RESPONSES TO PHQ QUESTIONS 1-9: 0
1. LITTLE INTEREST OR PLEASURE IN DOING THINGS: 0
SUM OF ALL RESPONSES TO PHQ QUESTIONS 1-9: 0

## 2022-07-29 NOTE — PROGRESS NOTES
(2020). CURRENT MEDICATIONS       Previous Medications    CPAP MACHINE MISC    by Does not apply route New CPAP with 11 cm of H2O    Dx. Sever SHANDA . Please give ASAP ,AHI 44.7    FEXOFENADINE (ALLEGRA) 60 MG TABLET    Take 60 mg by mouth    SYRINGE-NEEDLE, DISP, 3 ML (B-D INTEGRA SYRINGE) 22G X 1-1/2\" 3 ML MISC    1 each by Does not apply route daily    TESTOSTERONE CYPIONATE (DEPOTESTOTERONE CYPIONATE) 200 MG/ML INJECTION    Inject 1mL Q 2 weeks    TRIAMCINOLONE (KENALOG) 0.025 % CREAM    Apply topically 2 times daily. ALLERGIES     Patient is has No Known Allergies. FAMILY HISTORY     Patient'sfamily history includes Cancer in his maternal grandfather; Heart Disease in his father; Other in his maternal grandmother and paternal grandfather. SOCIAL HISTORY     Patient  reports that he has never smoked. He has never used smokeless tobacco. He reports current alcohol use. He reports that he does not use drugs. PHYSICAL EXAM     VITALS  BP: (!) 140/80, Temp: 96.8 °F (36 °C), Heart Rate: 54,  , SpO2: 97 %  Physical Exam  Vitals and nursing note reviewed. Constitutional:       General: He is awake. He is not in acute distress. Appearance: Normal appearance. He is well-developed. He is not ill-appearing, toxic-appearing or diaphoretic. HENT:      Head: Normocephalic and atraumatic. Right Ear: Hearing and external ear normal.      Left Ear: Hearing and external ear normal.      Nose: Nose normal.   Eyes:      General: Lids are normal. Vision grossly intact. Gaze aligned appropriately. Conjunctiva/sclera: Conjunctivae normal.   Cardiovascular:      Rate and Rhythm: Normal rate and regular rhythm. Pulses: Normal pulses. Heart sounds: Normal heart sounds, S1 normal and S2 normal.   Pulmonary:      Effort: Pulmonary effort is normal.      Breath sounds: Normal breath sounds and air entry. Genitourinary:     Penis: Normal and circumcised.  No phimosis, paraphimosis, hypospadias, erythema, tenderness, discharge, swelling or lesions. Testes: Normal.   Musculoskeletal:      Cervical back: Normal range of motion. Skin:     General: Skin is warm. Capillary Refill: Capillary refill takes less than 2 seconds. Neurological:      Mental Status: He is alert and oriented to person, place, and time. Gait: Gait is intact. Psychiatric:         Attention and Perception: Attention normal.         Mood and Affect: Mood normal.         Speech: Speech normal.         Behavior: Behavior normal. Behavior is cooperative. READY CARE COURSE   Labs:  Results for POC orders placed in visit on 07/29/22   POCT Urinalysis no Micro   Result Value Ref Range    Color, UA yellow     Clarity, UA clear     Glucose, UA POC 5     Bilirubin, UA neg     Ketones, UA neg     Spec Grav, UA 1.020     Blood, UA POC neg     pH, UA 7.0     Protein, UA POC 0.15     Urobilinogen, UA 35     Leukocytes, UA neg     Nitrite, UA neg      IMAGING:  No orders to display     Scheduled Meds:  Continuous Infusions:  PRN Meds:. PROCEDURES:  FINAL IMPRESSION      1. Dysuria    2. Glucosuria      DISPOSITION/PLAN   Will send for urine culture. POCT urinalysis negative. Recommend evaluation for gonorrhea and chlamydia even though low suspicion. Patient still maintains normal frequency. There is no decrease in his urine calibration and force. Patient denies any prostate tenderness. No fevers, chills, nausea, or discharge from the penis. Physical exam is unremarkable. Went over possible s/e of the medications. Patient would like to proceed with therapy. Discussed signs and symptoms which require immediate follow-up in ED/call to 911. Patient verbalized understanding. BMP ordered. On this date 7/29/2022 I have spent 20 minutes reviewing previous notes, test results and face to face with the patient discussing the diagnosis and importance of compliance with the treatment plan as well as documenting on the day of the visit.     PATIENT REFERRED TO:  Return if symptoms worsen or fail to improve. DISCHARGE MEDICATIONS:  New Prescriptions    No medications on file     Cannot display discharge medications since this is not an admission.        Chris Gonzalez

## 2022-07-30 LAB — URINE CULTURE, ROUTINE: NORMAL

## 2022-08-01 DIAGNOSIS — R81 GLUCOSURIA: ICD-10-CM

## 2022-08-01 LAB
ANION GAP SERPL CALCULATED.3IONS-SCNC: 11 MEQ/L (ref 9–15)
BUN BLDV-MCNC: 15 MG/DL (ref 6–20)
CALCIUM SERPL-MCNC: 9.5 MG/DL (ref 8.5–9.9)
CHLORIDE BLD-SCNC: 106 MEQ/L (ref 95–107)
CO2: 27 MEQ/L (ref 20–31)
CREAT SERPL-MCNC: 1.07 MG/DL (ref 0.7–1.2)
GFR AFRICAN AMERICAN: >60
GFR NON-AFRICAN AMERICAN: >60
GLUCOSE BLD-MCNC: 96 MG/DL (ref 70–99)
POTASSIUM SERPL-SCNC: 3.9 MEQ/L (ref 3.4–4.9)
SODIUM BLD-SCNC: 144 MEQ/L (ref 135–144)

## 2022-08-05 LAB
C. TRACHOMATIS DNA ,URINE: NEGATIVE
N. GONORRHOEAE DNA, URINE: NEGATIVE

## 2022-08-17 ENCOUNTER — NURSE ONLY (OUTPATIENT)
Dept: ENDOCRINOLOGY | Age: 32
End: 2022-08-17
Payer: COMMERCIAL

## 2022-08-17 DIAGNOSIS — E29.1 HYPOGONADISM IN MALE: Primary | ICD-10-CM

## 2022-08-17 PROCEDURE — 96372 THER/PROPH/DIAG INJ SC/IM: CPT | Performed by: INTERNAL MEDICINE

## 2022-08-17 RX ORDER — TESTOSTERONE CYPIONATE 200 MG/ML
200 INJECTION INTRAMUSCULAR ONCE
Status: COMPLETED | OUTPATIENT
Start: 2022-08-17 | End: 2022-08-17

## 2022-08-17 RX ADMIN — TESTOSTERONE CYPIONATE 200 MG: 200 INJECTION INTRAMUSCULAR at 14:44

## 2022-08-17 NOTE — PROGRESS NOTES
Patient given Testosterone 200mg IM right gluteal..  Will return in 2 weeks for next injection    Patient tolerated injection well.     Administrations This Visit       testosterone cypionate (DEPOTESTOTERONE CYPIONATE) injection 200 mg       Admin Date  08/17/2022 Action  Given Dose  200 mg Route  [facility] Administered By  Shiraz Church LPN

## 2022-08-31 ENCOUNTER — NURSE ONLY (OUTPATIENT)
Dept: ENDOCRINOLOGY | Age: 32
End: 2022-08-31
Payer: COMMERCIAL

## 2022-08-31 DIAGNOSIS — E29.1 HYPOGONADISM IN MALE: Primary | ICD-10-CM

## 2022-08-31 PROCEDURE — 96372 THER/PROPH/DIAG INJ SC/IM: CPT | Performed by: INTERNAL MEDICINE

## 2022-08-31 RX ORDER — TESTOSTERONE CYPIONATE 200 MG/ML
200 INJECTION INTRAMUSCULAR ONCE
Status: COMPLETED | OUTPATIENT
Start: 2022-08-31 | End: 2022-08-31

## 2022-08-31 RX ADMIN — TESTOSTERONE CYPIONATE 200 MG: 200 INJECTION INTRAMUSCULAR at 15:13

## 2022-08-31 NOTE — PROGRESS NOTES
Patient given Testosterone 200mg IM left gluteal..  Will return in 2 weeks for next injection    Patient tolerated injection well.     Administrations This Visit       testosterone cypionate (DEPOTESTOTERONE CYPIONATE) injection 200 mg       Admin Date  08/31/2022 Action  Given Dose  200 mg Route  [facility] Administered By  Isabel Sneed LPN

## 2022-09-08 DIAGNOSIS — E29.1 HYPOGONADISM IN MALE: ICD-10-CM

## 2022-09-08 LAB
HCT VFR BLD CALC: 46 % (ref 42–52)
HEMOGLOBIN: 15.7 G/DL (ref 14–18)
MCH RBC QN AUTO: 30 PG (ref 27–31.3)
MCHC RBC AUTO-ENTMCNC: 34 % (ref 33–37)
MCV RBC AUTO: 88.1 FL (ref 80–100)
PDW BLD-RTO: 13.9 % (ref 11.5–14.5)
PLATELET # BLD: 266 K/UL (ref 130–400)
RBC # BLD: 5.22 M/UL (ref 4.7–6.1)
WBC # BLD: 7.1 K/UL (ref 4.8–10.8)

## 2022-09-09 LAB
SEX HORMONE BINDING GLOBULIN: 8 NMOL/L (ref 11–80)
TESTOSTERONE FREE-NONMALE: 86.9 PG/ML (ref 47–244)
TESTOSTERONE TOTAL: 257 NG/DL (ref 220–1000)

## 2022-10-05 ENCOUNTER — OFFICE VISIT (OUTPATIENT)
Dept: ENDOCRINOLOGY | Age: 32
End: 2022-10-05
Payer: COMMERCIAL

## 2022-10-05 VITALS
BODY MASS INDEX: 44.09 KG/M2 | SYSTOLIC BLOOD PRESSURE: 106 MMHG | HEIGHT: 70 IN | DIASTOLIC BLOOD PRESSURE: 69 MMHG | HEART RATE: 70 BPM | OXYGEN SATURATION: 96 % | WEIGHT: 308 LBS

## 2022-10-05 DIAGNOSIS — E29.1 HYPOGONADISM IN MALE: Primary | ICD-10-CM

## 2022-10-05 DIAGNOSIS — E29.1 HYPOGONADISM MALE: ICD-10-CM

## 2022-10-05 PROCEDURE — 99213 OFFICE O/P EST LOW 20 MIN: CPT | Performed by: INTERNAL MEDICINE

## 2022-10-05 PROCEDURE — G8427 DOCREV CUR MEDS BY ELIG CLIN: HCPCS | Performed by: INTERNAL MEDICINE

## 2022-10-05 PROCEDURE — 1036F TOBACCO NON-USER: CPT | Performed by: INTERNAL MEDICINE

## 2022-10-05 PROCEDURE — 96372 THER/PROPH/DIAG INJ SC/IM: CPT | Performed by: INTERNAL MEDICINE

## 2022-10-05 PROCEDURE — G8484 FLU IMMUNIZE NO ADMIN: HCPCS | Performed by: INTERNAL MEDICINE

## 2022-10-05 PROCEDURE — G8417 CALC BMI ABV UP PARAM F/U: HCPCS | Performed by: INTERNAL MEDICINE

## 2022-10-05 RX ORDER — TESTOSTERONE CYPIONATE 200 MG/ML
240 INJECTION INTRAMUSCULAR ONCE
Status: COMPLETED | OUTPATIENT
Start: 2022-10-05 | End: 2022-10-05

## 2022-10-05 RX ORDER — TESTOSTERONE CYPIONATE 200 MG/ML
INJECTION INTRAMUSCULAR
Qty: 10 ML | Refills: 1
Start: 2022-10-05 | End: 2023-08-25

## 2022-10-05 RX ADMIN — TESTOSTERONE CYPIONATE 240 MG: 200 INJECTION INTRAMUSCULAR at 14:54

## 2022-10-05 NOTE — PROGRESS NOTES
Patient given Testosterone 240mg IM left gluteal..  Will return in 2 weeks for next injection    Patient tolerated injection well.     Administrations This Visit       testosterone cypionate (DEPOTESTOTERONE CYPIONATE) injection 240 mg       Admin Date  10/05/2022 Action  Given Dose  240 mg Route  [facility] Administered By  Jann Mckenzie LPN

## 2022-10-05 NOTE — PROGRESS NOTES
10/5/2022    Assessment:       Diagnosis Orders   1. Hypogonadism in male              PLAN:     Orders Placed This Encounter   Procedures    Testosterone, free, total     Standing Status:   Future     Standing Expiration Date:   10/5/2023     Increased dose of testosterone  Target testosterone range 600 midcycle  Orders Placed This Encounter   Medications    testosterone cypionate (DEPOTESTOTERONE CYPIONATE) 200 MG/ML injection     Sig: Inject 1.2 mL Q 2 weeks     Dispense:  10 mL     Refill:  1    testosterone cypionate (DEPOTESTOTERONE CYPIONATE) injection 240 mg       Subjective:     Chief Complaint   Patient presents with    Hypogonadism     Vitals:    10/05/22 1428   BP: 106/69   Pulse: 70   SpO2: 96%   Weight: (!) 308 lb (139.7 kg)   Height: 5' 10\" (1.778 m)     Wt Readings from Last 3 Encounters:   10/05/22 (!) 308 lb (139.7 kg)   07/29/22 (!) 304 lb (137.9 kg)   07/19/22 (!) 303 lb (137.4 kg)     BP Readings from Last 3 Encounters:   10/05/22 106/69   07/29/22 (!) 140/80   07/19/22 100/62     Follow-up on hypogonadism patient last testosterone level was still on the low side at 257 currently on testosterone 200 mg every 2weeks history of obesity sleep apnea    Other  This is a chronic (Hypogonadism) problem. The current episode started more than 1 year ago. The problem occurs intermittently. The problem has been waxing and waning. Associated symptoms include fatigue. Exacerbated by: Obesity. Treatments tried: Testosterone injections. The treatment provided mild relief.    Past Medical History:   Diagnosis Date    Hypogonadism in male     Sleep apnea      Past Surgical History:   Procedure Laterality Date    VASECTOMY  2020    [facility]     Social History     Socioeconomic History    Marital status:      Spouse name: Not on file    Number of children: Not on file    Years of education: Not on file    Highest education level: Not on file   Occupational History    Not on file   Tobacco Use    Smoking status: Never    Smokeless tobacco: Never   Vaping Use    Vaping Use: Not on file   Substance and Sexual Activity    Alcohol use: Yes     Comment: socially    Drug use: Never    Sexual activity: Not on file   Other Topics Concern    Not on file   Social History Narrative    Not on file     Social Determinants of Health     Financial Resource Strain: Low Risk     Difficulty of Paying Living Expenses: Not hard at all   Food Insecurity: No Food Insecurity    Worried About Running Out of Food in the Last Year: Never true    Ran Out of Food in the Last Year: Never true   Transportation Needs: Not on file   Physical Activity: Not on file   Stress: Not on file   Social Connections: Not on file   Intimate Partner Violence: Not on file   Housing Stability: Not on file     Family History   Problem Relation Age of Onset    Heart Disease Father         a fib/chf    Other Maternal Grandmother         no fh of aneurysms    Cancer Maternal Grandfather         no fdr or sdr with cancer other than aunt pancreatic cancer. (MATERNAL)    Other Paternal Grandfather         no congenital heart disease in famiy. No Known Allergies    Current Outpatient Medications:     CPAP Machine MISC, by Does not apply route New CPAP with 11 cm of H2O  Dx. Sever SHANDA . Please give ASAP ,AHI 44.7, Disp: 1 each, Rfl: 0    triamcinolone (KENALOG) 0.025 % cream, Apply topically 2 times daily. , Disp: 1 Tube, Rfl: 0    SYRINGE-NEEDLE, DISP, 3 ML (B-D INTEGRA SYRINGE) 22G X 1-1/2\" 3 ML MISC, 1 each by Does not apply route daily, Disp: 20 each, Rfl: 6    fexofenadine (ALLEGRA) 60 MG tablet, Take 60 mg by mouth, Disp: , Rfl:     testosterone cypionate (DEPOTESTOTERONE CYPIONATE) 200 MG/ML injection, Inject 1mL Q 2 weeks (Patient taking differently: Indications: OV note order 3/16/22 Inject 1mL Q 2 weeks), Disp: 10 mL, Rfl: 1  Lab Results   Component Value Date     08/01/2022    K 3.9 08/01/2022     08/01/2022    CO2 27 08/01/2022    BUN 15 08/01/2022 CREATININE 1.07 08/01/2022    GLUCOSE 96 08/01/2022    CALCIUM 9.5 08/01/2022    PROT 7.2 07/19/2021    LABALBU 4.7 (H) 07/19/2021    BILITOT 0.4 07/19/2021    ALKPHOS 66 07/19/2021    AST 22 07/19/2021    ALT 25 07/19/2021    LABGLOM >60.0 08/01/2022    GFRAA >60.0 08/01/2022    GLOB 2.5 07/19/2021     Lab Results   Component Value Date    WBC 7.1 09/08/2022    HGB 15.7 09/08/2022    HCT 46.0 09/08/2022    MCV 88.1 09/08/2022     09/08/2022     Lab Results   Component Value Date    LABA1C 5.6 07/19/2021    LABA1C 5.4 11/19/2018     Lab Results   Component Value Date    CHOLFAST 179 07/19/2021    TRIGLYCFAST 278 (H) 07/19/2021    HDL 29 (L) 07/19/2021    LDLCALC 94 07/19/2021     Lab Results   Component Value Date    TESTM 490 09/01/2021    TESTM 414 02/11/2021    TESTM 406 07/30/2020     Lab Results   Component Value Date    TSHREFLEX 1.440 07/19/2021    TSHREFLEX 1.470 01/27/2020    T4FREE 1.15 01/27/2020     Lab Results   Component Value Date    TPOABS <10.0 01/29/2020       Review of Systems   Constitutional:  Positive for fatigue. Endocrine: Negative. Genitourinary: Negative. All other systems reviewed and are negative. Objective:   Physical Exam  Vitals reviewed. Constitutional:       General: He is not in acute distress. Appearance: Normal appearance. He is obese. HENT:      Head: Normocephalic and atraumatic. Right Ear: External ear normal.      Left Ear: External ear normal.      Nose: Nose normal.   Eyes:      General: No scleral icterus. Right eye: No discharge. Left eye: No discharge. Extraocular Movements: Extraocular movements intact. Conjunctiva/sclera: Conjunctivae normal.   Cardiovascular:      Rate and Rhythm: Normal rate. Pulmonary:      Effort: Pulmonary effort is normal.   Musculoskeletal:         General: Normal range of motion. Cervical back: Normal range of motion and neck supple.    Neurological:      General: No focal deficit present. Mental Status: He is alert and oriented to person, place, and time.    Psychiatric:         Mood and Affect: Mood normal.         Behavior: Behavior normal.

## 2022-10-19 ENCOUNTER — NURSE ONLY (OUTPATIENT)
Dept: ENDOCRINOLOGY | Age: 32
End: 2022-10-19
Payer: COMMERCIAL

## 2022-10-19 DIAGNOSIS — E29.1 HYPOGONADISM IN MALE: Primary | ICD-10-CM

## 2022-10-19 PROCEDURE — 96372 THER/PROPH/DIAG INJ SC/IM: CPT | Performed by: INTERNAL MEDICINE

## 2022-10-19 RX ORDER — TESTOSTERONE CYPIONATE 200 MG/ML
200 INJECTION INTRAMUSCULAR ONCE
Status: COMPLETED | OUTPATIENT
Start: 2022-10-19 | End: 2022-10-19

## 2022-10-19 RX ADMIN — TESTOSTERONE CYPIONATE 240 MG: 200 INJECTION INTRAMUSCULAR at 15:41

## 2022-10-19 NOTE — PROGRESS NOTES
Patient given Testosterone 240mg IM right gluteal..  Will return in 2 weeks for next injection    Patient tolerated injection well.     Administrations This Visit       testosterone cypionate (DEPOTESTOTERONE CYPIONATE) injection 200 mg       Admin Date  10/19/2022 Action  Given Dose  240 mg Route  [facility] Administered By  Sharyle Domino, LPN

## 2022-11-01 ENCOUNTER — NURSE ONLY (OUTPATIENT)
Dept: ENDOCRINOLOGY | Age: 32
End: 2022-11-01
Payer: COMMERCIAL

## 2022-11-01 DIAGNOSIS — E29.1 HYPOGONADISM IN MALE: Primary | ICD-10-CM

## 2022-11-01 PROCEDURE — 96372 THER/PROPH/DIAG INJ SC/IM: CPT | Performed by: INTERNAL MEDICINE

## 2022-11-01 RX ORDER — TESTOSTERONE CYPIONATE 200 MG/ML
240 INJECTION INTRAMUSCULAR ONCE
Status: COMPLETED | OUTPATIENT
Start: 2022-11-01 | End: 2022-11-01

## 2022-11-01 RX ADMIN — TESTOSTERONE CYPIONATE 240 MG: 200 INJECTION INTRAMUSCULAR at 14:28

## 2022-11-01 NOTE — PROGRESS NOTES
Patient given Testosterone 240 mg IM left gluteal..  Will return in 2 weeks for next injection    Patient tolerated injection well.     Administrations This Visit       testosterone cypionate (DEPOTESTOTERONE CYPIONATE) injection 240 mg       Admin Date  11/01/2022 Action  Given Dose  240 mg Route  [facility] Administered By  Nidia Leyva LPN

## 2022-11-16 ENCOUNTER — NURSE ONLY (OUTPATIENT)
Dept: ENDOCRINOLOGY | Age: 32
End: 2022-11-16
Payer: COMMERCIAL

## 2022-11-16 DIAGNOSIS — E29.1 HYPOGONADISM IN MALE: Primary | ICD-10-CM

## 2022-11-16 PROCEDURE — 96372 THER/PROPH/DIAG INJ SC/IM: CPT | Performed by: INTERNAL MEDICINE

## 2022-11-16 RX ORDER — TESTOSTERONE CYPIONATE 200 MG/ML
200 INJECTION INTRAMUSCULAR ONCE
Status: COMPLETED | OUTPATIENT
Start: 2022-11-16 | End: 2022-11-16

## 2022-11-16 RX ADMIN — TESTOSTERONE CYPIONATE 200 MG: 200 INJECTION INTRAMUSCULAR at 15:15

## 2022-11-16 NOTE — PROGRESS NOTES
Patient given Testosterone 200mg IM right gluteal..  Will return in 2 weeks for next injection    Patient tolerated injection well.     Administrations This Visit       testosterone cypionate (DEPOTESTOTERONE CYPIONATE) injection 200 mg       Admin Date  11/16/2022 Action  Given Dose  200 mg Route  [facility] Administered By  Klaus Magaña LPN

## 2022-12-14 ENCOUNTER — NURSE ONLY (OUTPATIENT)
Dept: ENDOCRINOLOGY | Age: 32
End: 2022-12-14
Payer: COMMERCIAL

## 2022-12-14 DIAGNOSIS — E29.1 HYPOGONADISM IN MALE: Primary | ICD-10-CM

## 2022-12-14 PROCEDURE — 96372 THER/PROPH/DIAG INJ SC/IM: CPT | Performed by: INTERNAL MEDICINE

## 2022-12-14 RX ORDER — TESTOSTERONE CYPIONATE 200 MG/ML
200 INJECTION INTRAMUSCULAR ONCE
Status: COMPLETED | OUTPATIENT
Start: 2022-12-14 | End: 2022-12-14

## 2022-12-14 RX ADMIN — TESTOSTERONE CYPIONATE 200 MG: 200 INJECTION INTRAMUSCULAR at 14:52

## 2022-12-14 NOTE — PROGRESS NOTES
Patient given Testosterone 200mg IM right gluteal..  Will return in 2 weeks for next injection    Patient tolerated injection well.     Administrations This Visit       testosterone cypionate (DEPOTESTOTERONE CYPIONATE) injection 200 mg       Admin Date  12/14/2022 Action  Given Dose  200 mg Route  [facility] Administered By  Leticia Roca LPN

## 2022-12-29 ENCOUNTER — NURSE ONLY (OUTPATIENT)
Dept: ENDOCRINOLOGY | Age: 32
End: 2022-12-29
Payer: COMMERCIAL

## 2022-12-29 DIAGNOSIS — E29.1 HYPOGONADISM IN MALE: Primary | ICD-10-CM

## 2022-12-29 PROCEDURE — 96372 THER/PROPH/DIAG INJ SC/IM: CPT | Performed by: PHYSICIAN ASSISTANT

## 2022-12-29 RX ORDER — TESTOSTERONE CYPIONATE 200 MG/ML
240 INJECTION INTRAMUSCULAR ONCE
Status: COMPLETED | OUTPATIENT
Start: 2022-12-29 | End: 2022-12-29

## 2022-12-29 RX ADMIN — TESTOSTERONE CYPIONATE 240 MG: 200 INJECTION INTRAMUSCULAR at 14:36

## 2022-12-29 NOTE — PROGRESS NOTES
Patient given Testosterone 240mg IM left gluteal..  Will return in 2 weeks for next injection    Patient tolerated injection well.     Administrations This Visit       testosterone cypionate (DEPOTESTOTERONE CYPIONATE) injection 240 mg       Admin Date  12/29/2022 Action  Given Dose  240 mg Route  [facility] Administered By  Venecia Crain LPN

## 2023-01-11 ENCOUNTER — NURSE ONLY (OUTPATIENT)
Dept: ENDOCRINOLOGY | Age: 33
End: 2023-01-11
Payer: COMMERCIAL

## 2023-01-11 ENCOUNTER — OFFICE VISIT (OUTPATIENT)
Dept: PAIN MANAGEMENT | Age: 33
End: 2023-01-11
Payer: COMMERCIAL

## 2023-01-11 VITALS
HEIGHT: 70 IN | DIASTOLIC BLOOD PRESSURE: 68 MMHG | SYSTOLIC BLOOD PRESSURE: 116 MMHG | BODY MASS INDEX: 45.1 KG/M2 | TEMPERATURE: 98.3 F | WEIGHT: 315 LBS

## 2023-01-11 DIAGNOSIS — E29.1 HYPOGONADISM IN MALE: Primary | ICD-10-CM

## 2023-01-11 DIAGNOSIS — M54.12 CERVICAL RADICULOPATHY: Primary | ICD-10-CM

## 2023-01-11 DIAGNOSIS — G56.02 CARPAL TUNNEL SYNDROME OF LEFT WRIST: ICD-10-CM

## 2023-01-11 PROCEDURE — 99214 OFFICE O/P EST MOD 30 MIN: CPT | Performed by: PHYSICAL MEDICINE & REHABILITATION

## 2023-01-11 PROCEDURE — G8417 CALC BMI ABV UP PARAM F/U: HCPCS | Performed by: PHYSICAL MEDICINE & REHABILITATION

## 2023-01-11 PROCEDURE — 96372 THER/PROPH/DIAG INJ SC/IM: CPT | Performed by: INTERNAL MEDICINE

## 2023-01-11 PROCEDURE — G8427 DOCREV CUR MEDS BY ELIG CLIN: HCPCS | Performed by: PHYSICAL MEDICINE & REHABILITATION

## 2023-01-11 PROCEDURE — 1036F TOBACCO NON-USER: CPT | Performed by: PHYSICAL MEDICINE & REHABILITATION

## 2023-01-11 PROCEDURE — G8484 FLU IMMUNIZE NO ADMIN: HCPCS | Performed by: PHYSICAL MEDICINE & REHABILITATION

## 2023-01-11 RX ORDER — GABAPENTIN 300 MG/1
300 CAPSULE ORAL 3 TIMES DAILY
Qty: 90 CAPSULE | Refills: 0 | Status: SHIPPED | OUTPATIENT
Start: 2023-01-11 | End: 2023-02-10

## 2023-01-11 RX ORDER — TESTOSTERONE CYPIONATE 200 MG/ML
200 INJECTION INTRAMUSCULAR ONCE
Status: COMPLETED | OUTPATIENT
Start: 2023-01-11 | End: 2023-01-11

## 2023-01-11 RX ORDER — MELOXICAM 15 MG/1
15 TABLET ORAL DAILY
Qty: 30 TABLET | Refills: 0 | Status: SHIPPED | OUTPATIENT
Start: 2023-01-11 | End: 2023-02-10

## 2023-01-11 RX ORDER — LIDOCAINE 50 MG/G
1 PATCH TOPICAL DAILY
Qty: 30 PATCH | Refills: 0 | Status: SHIPPED | OUTPATIENT
Start: 2023-01-11 | End: 2023-02-10

## 2023-01-11 RX ADMIN — TESTOSTERONE CYPIONATE 200 MG: 200 INJECTION INTRAMUSCULAR at 14:50

## 2023-01-11 ASSESSMENT — ENCOUNTER SYMPTOMS
BACK PAIN: 0
SHORTNESS OF BREATH: 0
DIARRHEA: 0
CONSTIPATION: 0
NAUSEA: 0

## 2023-01-11 NOTE — PROGRESS NOTES
Patient given Testosterone 240mg IM right gluteal..  Will return in 2 weeks for next injection    Patient tolerated injection well.     Administrations This Visit       testosterone cypionate (DEPOTESTOTERONE CYPIONATE) injection 200 mg       Admin Date  01/11/2023 Action  Given Dose  200 mg Route  [facility] Administered By  Klaus Magaña LPN

## 2023-01-11 NOTE — PROGRESS NOTES
Kaylie Barney  ([id number])    1/11/2023    Subjective:     Kaylie Barney is 27 y.o. male who complains today of:    Chief Complaint   Patient presents with    Arm Pain          Neck Pain       Last seen by me 11/19/2021: Seen by pulmonology, advised to obtain sleep study, diagnosed with obstructive sleep apnea. \"The machine is terrible and I hate it\", does feel better when he uses it, tends to fall off his face overnight. XR L shoulder done, reviewed below. He notes that his symptoms had improved for a while, but now they have returned and gotten worse. Didn't get a chance to do therapy. Lidoderm patch never received. Meloxicam min relief, no side effects. Gabapentin min relief, no side effects. Left shoulder and left carpal tunnel inj not done. Uses carpal tunnel splint, it helps. No other tests therapy or updates from other physicians, no ER visits. Neck and left arm pain is a 3/10. Gets to a 6/10. Worse with working, driving, and activity. Better with rest and laying down. Located in the left side of his neck and down his left arm. Pain is worse with sitting down. Pain is better with laying down. There are no other associated symptoms or contextual factors. He denies any new weakness, saddle anesthesia, bowel or bladder dysfunction, or falls. Left shoulder pain 2/10. Worse with movement. Better with rest. There are no other associated symptoms or contextual factors. He denies any new weakness, saddle anesthesia, bowel or bladder dysfunction, or falls. Left hand pain is a 2/10. Gets to a 4/10. No weakness or clumsiness. Better with rest. Constant ache for over 1 year. Right hand is fine. Allergies:  Patient has no known allergies.     Past Medical History:   Diagnosis Date    Hypogonadism in male     Sleep apnea      Past Surgical History:   Procedure Laterality Date    VASECTOMY  2020    [facility]     Family History   Problem Relation Age of Onset    Heart Disease Father         a fib/chf Other Maternal Grandmother         no fh of aneurysms    Cancer Maternal Grandfather         no fdr or sdr with cancer other than aunt pancreatic cancer. (MATERNAL)    Other Paternal Grandfather         no congenital heart disease in [facility] [place]. Social History     Socioeconomic History    Marital status:      Spouse name: Not on file    Number of children: Not on file    Years of education: Not on file    Highest education level: Not on file   Occupational History    Not on file   Tobacco Use    Smoking status: Never    Smokeless tobacco: Never   Vaping Use    Vaping Use: Not on file   Substance and Sexual Activity    Alcohol use: Yes     Comment: socially    Drug use: Never    Sexual activity: Not on file   Other Topics Concern    Not on file   Social History Narrative    Not on file     Social Determinants of Health     Financial Resource Strain: Low Risk     Difficulty of Paying Living Expenses: Not hard at all   Food Insecurity: No Food Insecurity    Worried About Running Out of Food in the Last Year: Never true    Ran Out of Food in the Last Year: Never true   Transportation Needs: Not on file   Physical Activity: Not on file   Stress: Not on file   Social Connections: Not on file   Intimate Partner Violence: Not on file   Housing Stability: Not on file       Current Outpatient Medications on File Prior to Visit   Medication Sig Dispense Refill    testosterone cypionate (DEPOTESTOTERONE CYPIONATE) 200 MG/ML injection Inject 1.2 mL Q 2 weeks 10 mL 1    CPAP Machine MISC by Does not apply route New CPAP with 11 cm of H2O    Dx. Sever SHANDA . Please give ASAP ,AHI 44.7 1 each 0    triamcinolone (KENALOG) 0.025 % cream Apply topically 2 times daily. 1 Tube 0    SYRINGE-NEEDLE, DISP, 3 ML (B-D INTEGRA SYRINGE) 22G X 1-1/2\" 3 ML MISC 1 each by Does not apply route daily 20 each 6    fexofenadine (ALLEGRA) 60 MG tablet Take 60 mg by mouth       No current facility-administered medications on file prior to visit. Review of Systems   Constitutional:  Negative for fever. HENT:  Negative for hearing loss. Respiratory:  Negative for shortness of breath. Gastrointestinal:  Negative for constipation, diarrhea and nausea. Genitourinary:  Negative for difficulty urinating. Musculoskeletal:  Positive for neck pain. Negative for back pain. Skin:  Negative for rash. Neurological:  Negative for headaches. Hematological:  Does not bruise/bleed easily. Psychiatric/Behavioral:  Negative for sleep disturbance. Objective:     Vitals:  /68 (Site: Left Upper Arm)   Temp 98.3 °F (36.8 °C) (Temporal)   Ht 5' 10\" (1.778 m)   Wt (!) 325 lb (147.4 kg)   BMI 46.63 kg/m² Pain Score:   4      Exam performed under coronavirus precautions  General: No acute distress  Eyes: No scleral icterus appreciated bilaterally  ENMT: Moist mucous membranes  Neck: Symmetric without any overt masses or lesions  Respiratory: Respirations are non-labored  Skin: Visualized skin is intact  Psych: Pleasant and cooperative with history and exam.  Neurologic: Gait antalgic, no assistive devices for ambulation. Pt is alert and appropriately interactive. No signs of excessive sedation. Responds promptly and appropriately to questions asked. No aberrant behaviors appreciated. Sensation intact in both arms except for left C6 paresthesias  Reflexes and strength functional for arm use, no abnormal reflexes appreciated on exam today  Strength greater than 3/5 in both arms  Spurling's positive on exam today    Left median nerve paresthesias   Fairly preserved left shoulder range of motion           Outside record review:  XR L shoulder 11/19/2021: Downsloping acromion, partial os acromiale, no fracture  MRI Brachial Plexus 10/11/21: left brachial plexus appears normal in caliber and signal without abnormal enhancement. Negative limited left brachial plexus MRI  MRI C Spine 10/11/21: facet arthropathy.  Mild degenerative disc disease. C2/3 C3/4 C4/5 unremarkable. C5/6 disc protrusion, encroachment left C6 nerve root. Normal canal and normal right foramen. C6/7 normal canal and foramen. C7/T1 unremarkable. EMG B UE 8/25/21: mild bilateral median nerves suggesting carpal tunnel syndrome. C8/T1 mild radicular changes on the left, suggest thoracic outlet syndrome or C8/T1 radiculopathy. MRI C Spine and Brachial plexus is recommended. Component      Latest Ref Rng & Units 8/1/2022           9:40 AM   Creatinine      0.70 - 1.20 mg/dL 1.07     Component      Latest Ref Rng & Units 9/8/2022          12:15 PM   Platelet Count      846 - 400 K/uL 266       Opioid risk tool score 1, low risk. Assessment:      Diagnosis Orders   1. Cervical radiculopathy  [facility] [place]    lidocaine (LIDODERM) 5 %    meloxicam (MOBIC) 15 MG tablet    gabapentin (NEURONTIN) 300 MG capsule    [place] NJX DX/THER SBST INTRLMNR CRV/THRC W/IMG GDN    XR CERVICAL SPINE (2-3 VIEWS)      2. Carpal tunnel syndrome of left wrist  [facility] [place]    meloxicam (MOBIC) 15 MG tablet    gabapentin (NEURONTIN) 300 MG capsule            Plan:     Periodic Controlled Substance Monitoring: Obtaining appropriate analgesic effect of treatment. Jeannette Yung MD)    Orders Placed This Encounter   Medications    lidocaine (LIDODERM) 5 %     Sig: Place 1 patch onto the skin daily 12 hours on, 12 hours off. Dispense:  30 patch     Refill:  0    meloxicam (MOBIC) 15 MG tablet     Sig: Take 1 tablet by mouth daily Take with food, avoid other NSAIDs (Ibuprofen) and steroids     Dispense:  30 tablet     Refill:  0    gabapentin (NEURONTIN) 300 MG capsule     Sig: Take 1 capsule by mouth 3 times daily for 30 days.      Dispense:  90 capsule     Refill:  0         Orders Placed This Encounter   Procedures    XR CERVICAL SPINE (2-3 VIEWS)     Standing Status:   Future     Standing Expiration Date:   1/11/2024     Order Specific Question: Reason for exam:     Answer:   Please evaluate for the presence of cervical facet arthropathy    [facility] [place]     Referral Priority:   Routine     Referral Type:   Eval and Treat     Referral Reason:   Specialty Services Required     Requested Specialty:   Occupational Therapy     Number of Visits Requested:   1    [facility]     Referral Priority:   Routine     Referral Type:   Eval and Treat     Referral Reason:   Specialty Services Required     Requested Specialty:   Physical Therapist     Number of Visits Requested:   1    [place] NJX DX/THER SBST INTRLMNR CRV/THRC W/IMG GDN     Cervical Epidural C7/T1 ILESI under XR with Dr Tom Wolff. No anticoagulation, no antibiotics, no diabetes mellitus, no osteoporosis, no bleeding or platelet dysfunction, IV Dye okay, NKDA. 45 minute procedure. Standing Status:   Future     Standing Expiration Date:   4/11/2023         -Re order PT for cervical radiculopathy  -Re order OT for left carpal tunnel syndrome   -He certainly has MRI evidence of cervical radiculopathy. Given his distribution of symptoms, provocative maneuvers, physical exam, I am more convinced his symptoms are coming from his cervical spine. I am less concerned about his left shoulder at this time. He does not have as severe left hand symptoms at this time. -Reviewed XR L Shoulder above, all questions answered  -F/u Pulm for SHANDA treatment as recommended  -XR C Spine ap lat eval facet arthropathy  -Will hold on MRI L Shoulder at this time  -Resend Lidoderm 5% patch #30 no refill start 1/11/2023   -Restart and increase Meloxicam 7.5 to 15 mg by mouth daily #30 no refills start 1/11/2023. Avoid all other NSAIDs and steroids.  -Increase Gabapentin 300 mg qHS to TID to #90 no refills start 1/11/2023. [place] reviewed on 1/11/2023   -Consideration for Tizanidine may be given  -Will hold on opioids at this time  -Cervical Epidural C7/T1 ILESI under XR with Dr Tom Wolff. No anticoagulation, no antibiotics, no diabetes mellitus, no osteoporosis, no bleeding or platelet dysfunction, IV Dye CHUY bright. 45 minute procedure. Consider 10 mg dexamethasone. Left arm pain, reverse table orientation.   -Will hold on left shoulder and left carpal tunnel injection at this time  -Nightly use of left wrist splint. Controlled Substance Monitoring:    Acute and Chronic Pain Monitoring:   RX Monitoring 1/11/2023   Periodic Controlled Substance Monitoring Obtaining appropriate analgesic effect of treatment. Discussed the risks, side effects, and symptoms that would warrant urgent or emergent physician evaluation of all medications prescribed today. The patient was advised that NSAID-type medications have three important potential side effects: gastrointestinal irritation including hemorrhage, myocardial injury including possible infarction, and kidney injury. He was asked to take the medication with food, avoid any other NSAIDs or steroids, and discontinue if he develops any concerning symptoms. He should immediately stop the medication and proceed to the emergency department for chest pain, dark urine or difficulty with producing urine, vomiting, abdominal pain or black/tarry stools. The patient expresses understanding of these issues and all questions were answered.      Discussed the risks of the above recommended procedures including but not limited to bleeding, infection, worsened pain, damage to surrounding structures, side effects, toxicity, allergic reactions to medications used, immune and stress-response dysfunction, fat necrosis, decreased bone mineralization, cartilage loss, increased fracture risk, avascular necrosis, skin pigmentation changes, blood sugar elevation, need for surgery, premature damage or degeneration of the joint, as well as catastrophic injury such as vision loss, paralysis, stroke, bowel or bladder incontinence, ventilator dependence, loss of use of the joint and/or extremity, and death. Discussed the risks, benefits, alternative procedures, and alternatives to the procedure including no procedure at all. Discussed that we cannot undo any permanent neurologic or orthopaedic damage or change the course of any underlying disease. After thorough discussion, patient expressed complete understanding and willingness to proceed. Discussed the risks of the above recommended procedures including but not limited to bleeding, infection, worsened pain, damage to surrounding structures, side effects, toxicity, allergic reactions to medications used, immune and stress-response dysfunction, fat necrosis, skin pigmentation changes, blood sugar elevation, headache, vision changes, need for surgery, as well as catastrophic injury such as vision loss, paralysis, stroke, spinal cord and/or plexus infarction or injury, intrathecal injection, spinal cord puncture, arachnoiditis, discitis, bowel or bladder incontinence, ventilator dependence, loss of use of the arms and/or legs, and death. Discussed off-label use of corticosteroids and how the Food and Drug Administration (FDA) has not approved corticosteroids for epidural use. Discussed the risks, benefits, alternative procedures, and alternatives to the procedure including no procedure at all. Discussed that we cannot undo any permanent neurologic damage or change the course of any underlying disease. After thorough discussion, patient expressed understanding and willingness to proceed. Provided education and counseling regarding the diagnosis, prognosis, and treatment options. All questions were answered. Encouraged him to follow-up with his primary care physician and/or specialists as required for his overall health and management of his comorbidities as well as any new positive symptoms mentioned in review of systems above. Care was provided within the definitions and limitations of our specialty practice.  Encouraged lifestyle interventions including healthy habits, lifestyle changes, regular aerobic exercise and appropriate weight maintenance as advised by their primary care physician or cardiovascular health provider. Discussed well care and disease prevention/maintenance. Anatomic spine model was used to illustrate pathology. All recommendations for therapy are provided to improve function with activities of daily living, decrease pain, and help develop an exercise program. All recommendations for medications are meant to help decrease pain, improve function with activities of daily living, maintain compliance with home exercise program, and improve quality of life. All recommendations for therapeutic injections are meant to help decrease pain, improve function with activities of daily living, maintain compliance with home exercise program, improve quality of life, and decrease reliance upon oral medications. Encouraged compliance with his home exercise program. Recommended compliance with physical therapy program as outlined above. Informed him to wear bracing as appropriate, and that bracing is not a replacement for core strengthening or muscle stabilization. Discussed the elevated risks of excessive sedation while on pain medications. Advised him against driving or operating heavy machinery or performing any activities where he may harm himself or others while on pain medications. Particular caution was emphasized especially during dose adjustments and medication changes. Discussed the elevated risks of respiratory depression and death while on opioid medications, especially when combined with other sedative substances. Discussed the risks of temporary disability, permanent disability, morbidity, and mortality with poorly-managed or undiagnosed medical conditions and comorbidities.  Emphasized the importance of timely medical evaluation and treatment as previously recommended by us or other medical professionals. Risks of not pursing these recommendations were emphasized. The patient was offered a treatment at our facility. The physician and patient have discussed in detail the risk of exposure to and/or potential harm posed by the COVID-19 virus with having a office visits and procedures at this time versus the risk of delaying the visits and procedures. It is not possible to know either the risk of delaying the visits or procedure or chance of getting an infection with perfect accuracy, but a joint decision was made between the patient and the physician to proceed at this time with the scheduled visits and procedures. Advised him that any lab testing, imaging, or other diagnostic test results are best discussed in person in the office so that we can provide a clear explanation of their significance and best treatment based upon these results. It is his responsibility to make and keep a follow up appointment to discuss these test results in person to discuss the significance of the findings and appropriate follow-up steps. He expressed complete understanding and agreement with the entire plan as outlined above. Portions of this note may have been typed, auto-populated, dictated or transcribed by voice recognition resulting in errors, omissions, or close substitutions which may be missed despite careful proofreading. Please contact the author for any questions or concerns. Follow up:  Return in about 2 months (around 3/11/2023) for reassessment of pain and symptoms, P.T. Internal Ref.     Faby Pierce MD

## 2023-01-12 ENCOUNTER — TELEPHONE (OUTPATIENT)
Dept: PAIN MANAGEMENT | Age: 33
End: 2023-01-12

## 2023-01-12 NOTE — TELEPHONE ENCOUNTER
Cervical Epidural C7/T1 ILESI under XR with Dr Catherine Partida. No anticoagulation, no antibiotics, no diabetes mellitus, no osteoporosis, no bleeding or platelet dysfunction, IV Dye CHUY bright. 45 minute procedure. - NO AUTH REQUIRED.     PATIENT WILL CALL BACK ONCE HE HAS DONE THERAPY

## 2023-01-12 NOTE — TELEPHONE ENCOUNTER
C7-T1 ILESI    NO AUTH REQUIRED    OK to schedule procedure approved as above. Please note sides/levels approved and date range. (If applicable, sides/levels approved may differ from those ordered)    TO BE SCHEDULED WITH DR. Landry Cantu

## 2023-01-25 ENCOUNTER — NURSE ONLY (OUTPATIENT)
Dept: ENDOCRINOLOGY | Age: 33
End: 2023-01-25
Payer: COMMERCIAL

## 2023-01-25 DIAGNOSIS — E29.1 HYPOGONADISM IN MALE: Primary | ICD-10-CM

## 2023-01-25 PROCEDURE — 96372 THER/PROPH/DIAG INJ SC/IM: CPT | Performed by: INTERNAL MEDICINE

## 2023-01-25 RX ORDER — TESTOSTERONE CYPIONATE 200 MG/ML
200 INJECTION INTRAMUSCULAR ONCE
Status: COMPLETED | OUTPATIENT
Start: 2023-01-25 | End: 2023-01-25

## 2023-01-25 RX ADMIN — TESTOSTERONE CYPIONATE 240 MG: 200 INJECTION INTRAMUSCULAR at 14:31

## 2023-01-25 NOTE — PROGRESS NOTES
Patient given Testosterone 240mg IM left gluteal..  Will return in 2 weeks for next injection    Patient tolerated injection well.     Administrations This Visit       testosterone cypionate (DEPOTESTOTERONE CYPIONATE) injection 200 mg       Admin Date  01/25/2023 Action  Given Dose  200 mg Route  IntraMUSCular Administered By  Shiraz Church LPN Received in peds pacu post op revision tympanoplasty under GA at 1620  Laying in fowlers position, BBS slightly coarse but pt was able to cough and swallow moist secretions x2.  Placed on humidified blow by oxygen for comfort,as pt was a difficult intubation   quiet environment promoted as pt has had emergence delirium in the past.

## 2023-01-25 NOTE — PROGRESS NOTES
Patient given Testosterone 240mg IM left gluteal..  Will return in 2 weeks for next injection    Patient tolerated injection well.     Administrations This Visit       testosterone cypionate (DEPOTESTOTERONE CYPIONATE) injection 200 mg       Admin Date  01/25/2023 Action  Given Dose  240 mg Route  IntraMUSCular Administered By  Joel Marcelino LPN

## 2023-03-29 ENCOUNTER — NURSE ONLY (OUTPATIENT)
Dept: ENDOCRINOLOGY | Age: 33
End: 2023-03-29
Payer: COMMERCIAL

## 2023-03-29 DIAGNOSIS — E29.1 HYPOGONADISM IN MALE: Primary | ICD-10-CM

## 2023-03-29 PROCEDURE — 96372 THER/PROPH/DIAG INJ SC/IM: CPT | Performed by: INTERNAL MEDICINE

## 2023-03-29 RX ORDER — TESTOSTERONE CYPIONATE 200 MG/ML
200 INJECTION, SOLUTION INTRAMUSCULAR ONCE
Status: COMPLETED | OUTPATIENT
Start: 2023-03-29 | End: 2023-03-29

## 2023-03-29 RX ADMIN — TESTOSTERONE CYPIONATE 200 MG: 200 INJECTION, SOLUTION INTRAMUSCULAR at 15:01

## 2023-04-26 ENCOUNTER — NURSE ONLY (OUTPATIENT)
Dept: ENDOCRINOLOGY | Age: 33
End: 2023-04-26
Payer: COMMERCIAL

## 2023-04-26 DIAGNOSIS — E29.1 HYPOGONADISM IN MALE: Primary | ICD-10-CM

## 2023-04-26 PROCEDURE — 96372 THER/PROPH/DIAG INJ SC/IM: CPT | Performed by: PHYSICIAN ASSISTANT

## 2023-04-26 RX ORDER — TESTOSTERONE CYPIONATE 200 MG/ML
240 INJECTION, SOLUTION INTRAMUSCULAR ONCE
Status: COMPLETED | OUTPATIENT
Start: 2023-04-26 | End: 2023-04-26

## 2023-04-26 RX ADMIN — TESTOSTERONE CYPIONATE 240 MG: 200 INJECTION, SOLUTION INTRAMUSCULAR at 14:58

## 2023-04-26 NOTE — PROGRESS NOTES
Patient given Testosterone  240 mg IM right gluteal..  Will return in 2 weeks for next injection    Patient tolerated injection well.     Administrations This Visit       testosterone cypionate (DEPOTESTOTERONE CYPIONATE) injection 240 mg       Admin Date  04/26/2023 Action  Given Dose  240 mg Route  IntraMUSCular Administered By  PADMA Thomas

## 2023-05-10 ENCOUNTER — NURSE ONLY (OUTPATIENT)
Dept: ENDOCRINOLOGY | Age: 33
End: 2023-05-10
Payer: COMMERCIAL

## 2023-05-10 DIAGNOSIS — E29.1 HYPOGONADISM IN MALE: Primary | ICD-10-CM

## 2023-05-10 PROCEDURE — 96372 THER/PROPH/DIAG INJ SC/IM: CPT | Performed by: INTERNAL MEDICINE

## 2023-05-10 RX ORDER — TESTOSTERONE CYPIONATE 200 MG/ML
240 INJECTION, SOLUTION INTRAMUSCULAR ONCE
Status: COMPLETED | OUTPATIENT
Start: 2023-05-10 | End: 2023-05-10

## 2023-05-10 RX ADMIN — TESTOSTERONE CYPIONATE 240 MG: 200 INJECTION, SOLUTION INTRAMUSCULAR at 16:26

## 2023-05-10 NOTE — PROGRESS NOTES
Patient given Testosterone 240mg IM left gluteal..  Will return in 2 weeks for next injection    Patient tolerated injection well.     Administrations This Visit       testosterone cypionate (DEPOTESTOTERONE CYPIONATE) injection 240 mg       Admin Date  05/10/2023 Action  Given Dose  240 mg Route  IntraMUSCular Administered By  Lo Nelson LPN none

## 2023-06-28 ENCOUNTER — NURSE ONLY (OUTPATIENT)
Dept: ENDOCRINOLOGY | Age: 33
End: 2023-06-28
Payer: COMMERCIAL

## 2023-06-28 DIAGNOSIS — E29.1 HYPOGONADISM IN MALE: Primary | ICD-10-CM

## 2023-06-28 PROCEDURE — 96372 THER/PROPH/DIAG INJ SC/IM: CPT | Performed by: INTERNAL MEDICINE

## 2023-06-28 RX ORDER — TESTOSTERONE CYPIONATE 200 MG/ML
240 INJECTION, SOLUTION INTRAMUSCULAR ONCE
Status: COMPLETED | OUTPATIENT
Start: 2023-06-28 | End: 2023-06-28

## 2023-06-28 RX ADMIN — TESTOSTERONE CYPIONATE 240 MG: 200 INJECTION, SOLUTION INTRAMUSCULAR at 14:27

## 2023-08-02 ENCOUNTER — NURSE ONLY (OUTPATIENT)
Dept: ENDOCRINOLOGY | Age: 33
End: 2023-08-02
Payer: COMMERCIAL

## 2023-08-02 DIAGNOSIS — E29.1 HYPOGONADISM IN MALE: Primary | ICD-10-CM

## 2023-08-02 PROCEDURE — 96372 THER/PROPH/DIAG INJ SC/IM: CPT | Performed by: INTERNAL MEDICINE

## 2023-08-02 RX ORDER — TESTOSTERONE CYPIONATE 200 MG/ML
240 INJECTION, SOLUTION INTRAMUSCULAR ONCE
Status: COMPLETED | OUTPATIENT
Start: 2023-08-02 | End: 2023-08-02

## 2023-08-02 RX ADMIN — TESTOSTERONE CYPIONATE 240 MG: 200 INJECTION, SOLUTION INTRAMUSCULAR at 15:17

## 2023-08-02 NOTE — PROGRESS NOTES
Patient given Testosterone 240mg IM right gluteal..    Patient tolerated injection well.     Administrations This Visit       testosterone cypionate (DEPOTESTOTERONE CYPIONATE) injection 240 mg       Admin Date  08/02/2023 Action  Given Dose  240 mg Route  IntraMUSCular Administered By  Adan Garcia LPN

## 2023-08-16 ENCOUNTER — NURSE ONLY (OUTPATIENT)
Dept: ENDOCRINOLOGY | Age: 33
End: 2023-08-16
Payer: COMMERCIAL

## 2023-08-16 DIAGNOSIS — E29.1 HYPOGONADISM IN MALE: Primary | ICD-10-CM

## 2023-08-16 PROCEDURE — 96372 THER/PROPH/DIAG INJ SC/IM: CPT | Performed by: PHYSICIAN ASSISTANT

## 2023-08-16 RX ORDER — TESTOSTERONE CYPIONATE 200 MG/ML
240 INJECTION, SOLUTION INTRAMUSCULAR ONCE
Status: COMPLETED | OUTPATIENT
Start: 2023-08-16 | End: 2023-08-16

## 2023-08-16 RX ADMIN — TESTOSTERONE CYPIONATE 240 MG: 200 INJECTION, SOLUTION INTRAMUSCULAR at 15:05

## 2023-08-16 NOTE — PROGRESS NOTES
Patient given Testosterone 240mg IM left gluteal..  Will return in 2 weeks for next injection    Patient tolerated injection well.     Administrations This Visit       testosterone cypionate (DEPOTESTOTERONE CYPIONATE) injection 240 mg       Admin Date  08/16/2023 Action  Given Dose  240 mg Route  IntraMUSCular Administered By  Anita Del Toro LPN

## 2023-09-06 ENCOUNTER — NURSE ONLY (OUTPATIENT)
Dept: ENDOCRINOLOGY | Age: 33
End: 2023-09-06
Payer: COMMERCIAL

## 2023-09-06 DIAGNOSIS — E29.1 HYPOGONADISM IN MALE: Primary | ICD-10-CM

## 2023-09-06 PROCEDURE — 96372 THER/PROPH/DIAG INJ SC/IM: CPT | Performed by: INTERNAL MEDICINE

## 2023-09-06 RX ORDER — TESTOSTERONE CYPIONATE 200 MG/ML
240 INJECTION, SOLUTION INTRAMUSCULAR ONCE
Status: COMPLETED | OUTPATIENT
Start: 2023-09-06 | End: 2023-09-06

## 2023-09-06 RX ADMIN — TESTOSTERONE CYPIONATE 240 MG: 200 INJECTION, SOLUTION INTRAMUSCULAR at 14:44

## 2023-09-06 NOTE — PROGRESS NOTES
Patient given Testosterone 240mg IM right gluteal..  Will return in 2 weeks for next injection    Patient tolerated injection well.     Administrations This Visit       testosterone cypionate (DEPOTESTOTERONE CYPIONATE) injection 240 mg       Admin Date  09/06/2023 Action  Given Dose  240 mg Route  IntraMUSCular Administered By  Carlos Woo LPN

## 2023-09-20 ENCOUNTER — NURSE ONLY (OUTPATIENT)
Dept: ENDOCRINOLOGY | Age: 33
End: 2023-09-20
Payer: COMMERCIAL

## 2023-09-20 DIAGNOSIS — E29.1 HYPOGONADISM IN MALE: Primary | ICD-10-CM

## 2023-09-20 PROCEDURE — 96372 THER/PROPH/DIAG INJ SC/IM: CPT | Performed by: INTERNAL MEDICINE

## 2023-09-20 RX ORDER — TESTOSTERONE CYPIONATE 200 MG/ML
240 INJECTION, SOLUTION INTRAMUSCULAR ONCE
Status: COMPLETED | OUTPATIENT
Start: 2023-09-20 | End: 2023-09-20

## 2023-09-20 RX ADMIN — TESTOSTERONE CYPIONATE 240 MG: 200 INJECTION, SOLUTION INTRAMUSCULAR at 14:27

## 2023-09-20 NOTE — PROGRESS NOTES
Patient given Testosterone 240mg IM left gluteal..  Will return in 2 weeks for next injection    Patient tolerated injection well.     Administrations This Visit       testosterone cypionate (DEPOTESTOTERONE CYPIONATE) injection 240 mg       Admin Date  09/20/2023 Action  Given Dose  240 mg Route  IntraMUSCular Administered By  Sergei Maurer LPN

## 2023-10-04 ENCOUNTER — NURSE ONLY (OUTPATIENT)
Dept: ENDOCRINOLOGY | Age: 33
End: 2023-10-04
Payer: COMMERCIAL

## 2023-10-04 DIAGNOSIS — E29.1 HYPOGONADISM IN MALE: Primary | ICD-10-CM

## 2023-10-04 PROCEDURE — 96372 THER/PROPH/DIAG INJ SC/IM: CPT | Performed by: INTERNAL MEDICINE

## 2023-10-04 RX ORDER — TESTOSTERONE CYPIONATE 200 MG/ML
240 INJECTION, SOLUTION INTRAMUSCULAR ONCE
Status: COMPLETED | OUTPATIENT
Start: 2023-10-04 | End: 2023-10-04

## 2023-10-04 RX ADMIN — TESTOSTERONE CYPIONATE 240 MG: 200 INJECTION, SOLUTION INTRAMUSCULAR at 14:49

## 2023-10-04 NOTE — PROGRESS NOTES
Patient given Testosterone 240mg IM right gluteal..  Will return in 2 weeks for next injection    Patient tolerated injection well.     Administrations This Visit       testosterone cypionate (DEPOTESTOTERONE CYPIONATE) injection 240 mg       Admin Date  10/04/2023 Action  Given Dose  240 mg Route  IntraMUSCular Administered By  Salvador Marie LPN

## 2023-10-18 ENCOUNTER — NURSE ONLY (OUTPATIENT)
Dept: ENDOCRINOLOGY | Age: 33
End: 2023-10-18
Payer: COMMERCIAL

## 2023-10-18 DIAGNOSIS — E29.1 HYPOGONADISM IN MALE: Primary | ICD-10-CM

## 2023-10-18 PROCEDURE — 96372 THER/PROPH/DIAG INJ SC/IM: CPT | Performed by: INTERNAL MEDICINE

## 2023-10-18 RX ORDER — TESTOSTERONE CYPIONATE 200 MG/ML
240 INJECTION, SOLUTION INTRAMUSCULAR ONCE
Status: COMPLETED | OUTPATIENT
Start: 2023-10-18 | End: 2023-10-18

## 2023-10-18 RX ADMIN — TESTOSTERONE CYPIONATE 240 MG: 200 INJECTION, SOLUTION INTRAMUSCULAR at 14:51

## 2023-10-18 NOTE — PROGRESS NOTES
Patient given Testosterone 240mg IM left gluteal..  Will return in 2 weeks for next injection    Patient tolerated injection well.     Administrations This Visit       testosterone cypionate (DEPOTESTOTERONE CYPIONATE) injection 240 mg       Admin Date  10/18/2023 Action  Given Dose  240 mg Route  IntraMUSCular Administered By  Marielle River LPN

## 2023-11-01 ENCOUNTER — NURSE ONLY (OUTPATIENT)
Dept: ENDOCRINOLOGY | Age: 33
End: 2023-11-01
Payer: COMMERCIAL

## 2023-11-01 DIAGNOSIS — E29.1 HYPOGONADISM IN MALE: Primary | ICD-10-CM

## 2023-11-01 PROCEDURE — 96372 THER/PROPH/DIAG INJ SC/IM: CPT | Performed by: INTERNAL MEDICINE

## 2023-11-01 RX ORDER — TESTOSTERONE CYPIONATE 200 MG/ML
240 INJECTION, SOLUTION INTRAMUSCULAR ONCE
Status: COMPLETED | OUTPATIENT
Start: 2023-11-01 | End: 2023-11-01

## 2023-11-01 RX ADMIN — TESTOSTERONE CYPIONATE 240 MG: 200 INJECTION, SOLUTION INTRAMUSCULAR at 14:49

## 2023-11-01 NOTE — PROGRESS NOTES
Patient given Testosterone 240mg IM right gluteal..    Patient tolerated injection well.     Administrations This Visit       testosterone cypionate (DEPOTESTOTERONE CYPIONATE) injection 240 mg       Admin Date  11/01/2023 Action  Given Dose  240 mg Route  IntraMUSCular Administered By  Maria M Louis LPN

## 2023-11-08 DIAGNOSIS — E29.1 HYPOGONADISM IN MALE: ICD-10-CM

## 2023-11-08 LAB
BASOPHILS # BLD: 0.1 K/UL (ref 0–0.2)
BASOPHILS NFR BLD: 0.8 %
EOSINOPHIL # BLD: 0.1 K/UL (ref 0–0.7)
EOSINOPHIL NFR BLD: 2.1 %
ERYTHROCYTE [DISTWIDTH] IN BLOOD BY AUTOMATED COUNT: 12.7 % (ref 11.5–14.5)
HCT VFR BLD AUTO: 48.9 % (ref 42–52)
HGB BLD-MCNC: 16 G/DL (ref 14–18)
LYMPHOCYTES # BLD: 2 K/UL (ref 1–4.8)
LYMPHOCYTES NFR BLD: 29.9 %
MCH RBC QN AUTO: 28.3 PG (ref 27–31.3)
MCHC RBC AUTO-ENTMCNC: 32.7 % (ref 33–37)
MCV RBC AUTO: 86.4 FL (ref 79–92.2)
MONOCYTES # BLD: 0.4 K/UL (ref 0.2–0.8)
MONOCYTES NFR BLD: 6 %
NEUTROPHILS # BLD: 4.1 K/UL (ref 1.4–6.5)
NEUTS SEG NFR BLD: 61 %
PLATELET # BLD AUTO: 283 K/UL (ref 130–400)
RBC # BLD AUTO: 5.66 M/UL (ref 4.7–6.1)
WBC # BLD AUTO: 6.6 K/UL (ref 4.8–10.8)

## 2023-11-09 LAB
SHBG SERPL-SCNC: 9 NMOL/L (ref 11–80)
TESTOST FREE SERPL-MCNC: 208.3 PG/ML (ref 47–244)
TESTOST SERPL-MCNC: 597 NG/DL (ref 220–1000)

## 2023-11-13 ENCOUNTER — OFFICE VISIT (OUTPATIENT)
Dept: FAMILY MEDICINE CLINIC | Age: 33
End: 2023-11-13
Payer: COMMERCIAL

## 2023-11-13 VITALS
HEART RATE: 60 BPM | OXYGEN SATURATION: 97 % | HEIGHT: 70 IN | WEIGHT: 315 LBS | TEMPERATURE: 98.2 F | SYSTOLIC BLOOD PRESSURE: 126 MMHG | RESPIRATION RATE: 16 BRPM | BODY MASS INDEX: 45.1 KG/M2 | DIASTOLIC BLOOD PRESSURE: 76 MMHG

## 2023-11-13 DIAGNOSIS — R30.0 DYSURIA: Primary | ICD-10-CM

## 2023-11-13 DIAGNOSIS — A54.9 GONORRHEA: ICD-10-CM

## 2023-11-13 DIAGNOSIS — R30.0 DYSURIA: ICD-10-CM

## 2023-11-13 PROCEDURE — 99213 OFFICE O/P EST LOW 20 MIN: CPT | Performed by: NURSE PRACTITIONER

## 2023-11-13 PROCEDURE — G8417 CALC BMI ABV UP PARAM F/U: HCPCS | Performed by: NURSE PRACTITIONER

## 2023-11-13 PROCEDURE — G8484 FLU IMMUNIZE NO ADMIN: HCPCS | Performed by: NURSE PRACTITIONER

## 2023-11-13 PROCEDURE — 1036F TOBACCO NON-USER: CPT | Performed by: NURSE PRACTITIONER

## 2023-11-13 PROCEDURE — 81003 URINALYSIS AUTO W/O SCOPE: CPT | Performed by: NURSE PRACTITIONER

## 2023-11-13 PROCEDURE — G8427 DOCREV CUR MEDS BY ELIG CLIN: HCPCS | Performed by: NURSE PRACTITIONER

## 2023-11-13 RX ORDER — PHENAZOPYRIDINE HYDROCHLORIDE 200 MG/1
200 TABLET, FILM COATED ORAL 3 TIMES DAILY PRN
Qty: 9 TABLET | Refills: 0 | Status: SHIPPED | OUTPATIENT
Start: 2023-11-13 | End: 2023-11-16

## 2023-11-13 SDOH — ECONOMIC STABILITY: HOUSING INSECURITY
IN THE LAST 12 MONTHS, WAS THERE A TIME WHEN YOU DID NOT HAVE A STEADY PLACE TO SLEEP OR SLEPT IN A SHELTER (INCLUDING NOW)?: NO

## 2023-11-13 SDOH — ECONOMIC STABILITY: INCOME INSECURITY: HOW HARD IS IT FOR YOU TO PAY FOR THE VERY BASICS LIKE FOOD, HOUSING, MEDICAL CARE, AND HEATING?: NOT HARD AT ALL

## 2023-11-13 SDOH — ECONOMIC STABILITY: FOOD INSECURITY: WITHIN THE PAST 12 MONTHS, YOU WORRIED THAT YOUR FOOD WOULD RUN OUT BEFORE YOU GOT MONEY TO BUY MORE.: NEVER TRUE

## 2023-11-13 SDOH — ECONOMIC STABILITY: FOOD INSECURITY: WITHIN THE PAST 12 MONTHS, THE FOOD YOU BOUGHT JUST DIDN'T LAST AND YOU DIDN'T HAVE MONEY TO GET MORE.: NEVER TRUE

## 2023-11-13 ASSESSMENT — ENCOUNTER SYMPTOMS
ABDOMINAL PAIN: 0
DIARRHEA: 0
ABDOMINAL DISTENTION: 0
NAUSEA: 0

## 2023-11-13 ASSESSMENT — PATIENT HEALTH QUESTIONNAIRE - PHQ9
2. FEELING DOWN, DEPRESSED OR HOPELESS: 0
SUM OF ALL RESPONSES TO PHQ QUESTIONS 1-9: 0
SUM OF ALL RESPONSES TO PHQ QUESTIONS 1-9: 0
SUM OF ALL RESPONSES TO PHQ9 QUESTIONS 1 & 2: 0
SUM OF ALL RESPONSES TO PHQ QUESTIONS 1-9: 0
1. LITTLE INTEREST OR PLEASURE IN DOING THINGS: 0
SUM OF ALL RESPONSES TO PHQ QUESTIONS 1-9: 0

## 2023-11-15 ENCOUNTER — NURSE ONLY (OUTPATIENT)
Dept: ENDOCRINOLOGY | Age: 33
End: 2023-11-15
Payer: COMMERCIAL

## 2023-11-15 DIAGNOSIS — E29.1 HYPOGONADISM IN MALE: Primary | ICD-10-CM

## 2023-11-15 LAB — BACTERIA UR CULT: NORMAL

## 2023-11-15 PROCEDURE — 96372 THER/PROPH/DIAG INJ SC/IM: CPT | Performed by: INTERNAL MEDICINE

## 2023-11-15 RX ORDER — TESTOSTERONE CYPIONATE 200 MG/ML
240 INJECTION, SOLUTION INTRAMUSCULAR ONCE
Status: COMPLETED | OUTPATIENT
Start: 2023-11-15 | End: 2023-11-15

## 2023-11-15 RX ADMIN — TESTOSTERONE CYPIONATE 240 MG: 200 INJECTION, SOLUTION INTRAMUSCULAR at 14:51

## 2023-11-15 NOTE — PROGRESS NOTES
Patient given Testosterone 240mg IM left gluteal..  Will return in 2 weeks for next injection    Patient tolerated injection well.     Administrations This Visit       testosterone cypionate (DEPOTESTOTERONE CYPIONATE) injection 240 mg       Admin Date  11/15/2023 Action  Given Dose  240 mg Route  IntraMUSCular Administered By  Brea Landers LPN

## 2023-11-16 ENCOUNTER — PATIENT MESSAGE (OUTPATIENT)
Dept: FAMILY MEDICINE CLINIC | Age: 33
End: 2023-11-16

## 2023-11-17 LAB
C TRACH DNA UR QL NAA+PROBE: NEGATIVE
N GONORRHOEA DNA UR QL NAA+PROBE: POSITIVE

## 2023-11-18 RX ORDER — CEFTRIAXONE 500 MG/1
500 INJECTION, POWDER, FOR SOLUTION INTRAMUSCULAR; INTRAVENOUS ONCE
Status: SHIPPED | OUTPATIENT
Start: 2023-11-18

## 2023-11-18 NOTE — TELEPHONE ENCOUNTER
Kassie Stoddard,     Your test result for the urine studies resulted gonorrhea. To receive treatment, you will need to come to one of our walk-in clinics. You do not need another appt. I have ordered your med & at check-in just tell them your name & that you have an antibiotic injection ordered in the system. Weekend availability:   [facility], or [place].      Sunday [facility] or [place]

## 2023-11-20 ENCOUNTER — NURSE ONLY (OUTPATIENT)
Dept: FAMILY MEDICINE CLINIC | Age: 33
End: 2023-11-20
Payer: COMMERCIAL

## 2023-11-20 PROCEDURE — 96372 THER/PROPH/DIAG INJ SC/IM: CPT | Performed by: PHYSICIAN ASSISTANT

## 2023-11-20 RX ADMIN — CEFTRIAXONE 500 MG: 500 INJECTION, POWDER, FOR SOLUTION INTRAMUSCULAR; INTRAVENOUS at 11:26

## 2023-12-05 ENCOUNTER — OFFICE VISIT (OUTPATIENT)
Dept: ENDOCRINOLOGY | Age: 33
End: 2023-12-05
Payer: COMMERCIAL

## 2023-12-05 VITALS
OXYGEN SATURATION: 96 % | DIASTOLIC BLOOD PRESSURE: 72 MMHG | WEIGHT: 315 LBS | SYSTOLIC BLOOD PRESSURE: 118 MMHG | HEIGHT: 70 IN | BODY MASS INDEX: 45.1 KG/M2 | HEART RATE: 85 BPM

## 2023-12-05 DIAGNOSIS — E66.01 MORBID OBESITY (HCC): ICD-10-CM

## 2023-12-05 DIAGNOSIS — E29.1 HYPOGONADISM MALE: ICD-10-CM

## 2023-12-05 DIAGNOSIS — E29.1 HYPOGONADISM IN MALE: Primary | ICD-10-CM

## 2023-12-05 DIAGNOSIS — E78.2 MIXED HYPERLIPIDEMIA: ICD-10-CM

## 2023-12-05 PROCEDURE — G8427 DOCREV CUR MEDS BY ELIG CLIN: HCPCS | Performed by: INTERNAL MEDICINE

## 2023-12-05 PROCEDURE — 99213 OFFICE O/P EST LOW 20 MIN: CPT | Performed by: INTERNAL MEDICINE

## 2023-12-05 PROCEDURE — 1036F TOBACCO NON-USER: CPT | Performed by: INTERNAL MEDICINE

## 2023-12-05 PROCEDURE — G8417 CALC BMI ABV UP PARAM F/U: HCPCS | Performed by: INTERNAL MEDICINE

## 2023-12-05 PROCEDURE — G8484 FLU IMMUNIZE NO ADMIN: HCPCS | Performed by: INTERNAL MEDICINE

## 2023-12-05 RX ORDER — TESTOSTERONE CYPIONATE 200 MG/ML
INJECTION, SOLUTION INTRAMUSCULAR
Qty: 10 ML | Refills: 1
Start: 2023-12-05 | End: 2024-10-24

## 2023-12-05 NOTE — PROGRESS NOTES
Units 11/08/23 10:10   Sex Hormone Binding 11 - 80 nmol/L 9 (L)   Testosterone 220 - 1,000 ng/dL 597   Testosterone, Free 47 - 244 pg/mL 208.3   (L): Data is abnormally low      Past Medical History:   Diagnosis Date    Hypogonadism in male     Sleep apnea      Past Surgical History:   Procedure Laterality Date    VASECTOMY  2020    [facility]     Social History     Socioeconomic History    Marital status:      Spouse name: Not on file    Number of children: Not on file    Years of education: Not on file    Highest education level: Not on file   Occupational History    Not on file   Tobacco Use    Smoking status: Never    Smokeless tobacco: Never   Vaping Use    Vaping Use: Not on file   Substance and Sexual Activity    Alcohol use: Yes     Comment: socially    Drug use: Never    Sexual activity: Not on file   Other Topics Concern    Not on file   Social History Narrative    Not on file     Social Determinants of Health     Financial Resource Strain: Low Risk  (11/13/2023)    Overall Financial Resource Strain (CARDIA)     Difficulty of Paying Living Expenses: Not hard at all   Food Insecurity: Not on file (11/13/2023)   Transportation Needs: Unknown (11/13/2023)    PRAPARE - Transportation     Lack of Transportation (Medical): Not on file     Lack of Transportation (Non-Medical): No   Physical Activity: Not on file   Stress: Not on file   Social Connections: Not on file   Intimate Partner Violence: Not on file   Housing Stability: Unknown (11/13/2023)    Housing Stability Vital Sign     Unable to Pay for Housing in the Last Year: Not on file     Number of Places Lived in the Last Year: Not on file     Unstable Housing in the Last Year: No     Family History   Problem Relation Age of Onset    Heart Disease Father         a fib/chf    Other Maternal Grandmother         no fh of aneurysms    Cancer Maternal Grandfather         no fdr or sdr with cancer other than aunt pancreatic cancer.   (MATERNAL)    Other Paternal

## 2023-12-06 ENCOUNTER — NURSE ONLY (OUTPATIENT)
Dept: ENDOCRINOLOGY | Age: 33
End: 2023-12-06
Payer: COMMERCIAL

## 2023-12-06 DIAGNOSIS — E29.1 HYPOGONADISM IN MALE: Primary | ICD-10-CM

## 2023-12-06 PROCEDURE — 96372 THER/PROPH/DIAG INJ SC/IM: CPT | Performed by: INTERNAL MEDICINE

## 2023-12-06 RX ORDER — TESTOSTERONE CYPIONATE 200 MG/ML
240 INJECTION, SOLUTION INTRAMUSCULAR ONCE
Status: COMPLETED | OUTPATIENT
Start: 2023-12-06 | End: 2023-12-06

## 2023-12-06 RX ADMIN — TESTOSTERONE CYPIONATE 240 MG: 200 INJECTION, SOLUTION INTRAMUSCULAR at 14:58

## 2023-12-06 NOTE — PROGRESS NOTES
Patient given Testosterone 240mg IM right gluteal..  Will return in 2 weeks for next injection    Patient tolerated injection well.     Administrations This Visit       testosterone cypionate (DEPOTESTOTERONE CYPIONATE) injection 240 mg       Admin Date  12/06/2023 Action  Given Dose  240 mg Route  [facility] Administered By  Chacho Parkinson LPN

## 2024-01-24 ENCOUNTER — NURSE ONLY (OUTPATIENT)
Dept: ENDOCRINOLOGY | Age: 34
End: 2024-01-24
Payer: COMMERCIAL

## 2024-01-24 DIAGNOSIS — E29.1 HYPOGONADISM IN MALE: Primary | ICD-10-CM

## 2024-01-24 PROCEDURE — 96372 THER/PROPH/DIAG INJ SC/IM: CPT | Performed by: INTERNAL MEDICINE

## 2024-01-24 RX ORDER — TESTOSTERONE CYPIONATE 200 MG/ML
240 INJECTION, SOLUTION INTRAMUSCULAR ONCE
Status: COMPLETED | OUTPATIENT
Start: 2024-01-24 | End: 2024-01-24

## 2024-01-24 RX ADMIN — TESTOSTERONE CYPIONATE 240 MG: 200 INJECTION, SOLUTION INTRAMUSCULAR at 14:47

## 2024-01-24 NOTE — PROGRESS NOTES
Patient given Testosterone 240mg IM left gluteal..  Will return in 2 weeks for next injection    Patient tolerated injection well.    Administrations This Visit       testosterone cypionate (DEPOTESTOTERONE CYPIONATE) injection 240 mg       Admin Date  01/24/2024 Action  Given Dose  240 mg Route  [facility] Administered By  Yeimi Jones LPN

## 2024-02-07 ENCOUNTER — NURSE ONLY (OUTPATIENT)
Dept: ENDOCRINOLOGY | Age: 34
End: 2024-02-07
Payer: COMMERCIAL

## 2024-02-07 DIAGNOSIS — E29.1 HYPOGONADISM IN MALE: Primary | ICD-10-CM

## 2024-02-07 PROCEDURE — 96372 THER/PROPH/DIAG INJ SC/IM: CPT | Performed by: INTERNAL MEDICINE

## 2024-02-07 RX ORDER — TESTOSTERONE CYPIONATE 200 MG/ML
240 INJECTION, SOLUTION INTRAMUSCULAR ONCE
Status: COMPLETED | OUTPATIENT
Start: 2024-02-07 | End: 2024-02-07

## 2024-02-07 RX ADMIN — TESTOSTERONE CYPIONATE 240 MG: 200 INJECTION, SOLUTION INTRAMUSCULAR at 15:33

## 2024-02-07 NOTE — PROGRESS NOTES
Patient given Testosterone 240mg IM right gluteal..  Will return in 2 weeks for next injection    Patient tolerated injection well.    Administrations This Visit       testosterone cypionate (DEPOTESTOTERONE CYPIONATE) injection 240 mg       Admin Date  02/07/2024 Action  Given Dose  240 mg Route  [facility] Administered By  Yeimi Jones LPN

## 2024-02-20 ENCOUNTER — OFFICE VISIT (OUTPATIENT)
Dept: ORTHOPEDIC SURGERY | Age: 34
End: 2024-02-20
Payer: COMMERCIAL

## 2024-02-20 ENCOUNTER — HOSPITAL ENCOUNTER (OUTPATIENT)
Dept: ORTHOPEDIC SURGERY | Age: 34
Discharge: HOME | End: 2024-02-22
Payer: COMMERCIAL

## 2024-02-20 VITALS — BODY MASS INDEX: 45.1 KG/M2 | HEIGHT: 70 IN | WEIGHT: 315 LBS

## 2024-02-20 DIAGNOSIS — M22.2X1 PATELLOFEMORAL PAIN SYNDROME OF RIGHT KNEE: ICD-10-CM

## 2024-02-20 DIAGNOSIS — M25.561 RIGHT KNEE PAIN, UNSPECIFIED CHRONICITY: ICD-10-CM

## 2024-02-20 DIAGNOSIS — M25.561 RIGHT KNEE PAIN, UNSPECIFIED CHRONICITY: Primary | ICD-10-CM

## 2024-02-20 PROCEDURE — 1036F TOBACCO NON-USER: CPT | Performed by: PHYSICIAN ASSISTANT

## 2024-02-20 PROCEDURE — G8484 FLU IMMUNIZE NO ADMIN: HCPCS | Performed by: PHYSICIAN ASSISTANT

## 2024-02-20 PROCEDURE — G8427 DOCREV CUR MEDS BY ELIG CLIN: HCPCS | Performed by: PHYSICIAN ASSISTANT

## 2024-02-20 PROCEDURE — 73562 X-RAY EXAM OF KNEE 3: CPT

## 2024-02-20 PROCEDURE — G8417 CALC BMI ABV UP PARAM F/U: HCPCS | Performed by: PHYSICIAN ASSISTANT

## 2024-02-20 PROCEDURE — 99203 OFFICE O/P NEW LOW 30 MIN: CPT | Performed by: PHYSICIAN ASSISTANT

## 2024-02-20 RX ORDER — MELOXICAM 15 MG/1
15 TABLET ORAL DAILY
Qty: 30 TABLET | Refills: 3 | Status: SHIPPED | OUTPATIENT
Start: 2024-02-20

## 2024-02-20 NOTE — PROGRESS NOTES
[facility]      Subjective:      Chief Complaint   Patient presents with    New Patient     Pt presents today for his Right Knee. He states that both knees are messed up. The right knee was smashed on the concrete about 3 years ago at [place]. Xr was taking but nothing appeared to be broken. Pain is a 6-7. Going up the stairs he can hear like a crackling paper noise to them.        HPI: Kevin Mcdowell is a 33 y.o. male who is here for chronic right knee pain that is mainly medial.  He states that he did have a fall when he is at [place] about few years back directly onto the front of his knee.  There is no twisting or turning or hyperextension.  He occasionally hears some cracks whenever he flexes his knee.  He is a slightly larger individual.  He has not tried any bracing, therapy, injections, anti-inflammatories.    Past Medical History:   Diagnosis Date    Hypogonadism in male     Sleep apnea       Past Surgical History:   Procedure Laterality Date    VASECTOMY  2020    [facility]     Social History     Socioeconomic History    Marital status:      Spouse name: Not on file    Number of children: Not on file    Years of education: Not on file    Highest education level: Not on file   Occupational History    Not on file   Tobacco Use    Smoking status: Never    Smokeless tobacco: Never   Vaping Use    Vaping Use: Not on file   Substance and Sexual Activity    Alcohol use: Yes     Comment: socially    Drug use: Never    Sexual activity: Not on file   Other Topics Concern    Not on file   Social History Narrative    Not on file     Social Determinants of Health     Financial Resource Strain: Low Risk  (11/13/2023)    Overall Financial Resource Strain (CARDIA)     Difficulty of Paying Living Expenses: Not hard at all   Food Insecurity: Not on file (11/13/2023)   Transportation Needs: Unknown (11/13/2023)    PRAPARE - Transportation     Lack of Transportation (Medical): Not

## 2024-02-21 ENCOUNTER — NURSE ONLY (OUTPATIENT)
Dept: ENDOCRINOLOGY | Age: 34
End: 2024-02-21
Payer: COMMERCIAL

## 2024-02-21 DIAGNOSIS — E29.1 HYPOGONADISM IN MALE: Primary | ICD-10-CM

## 2024-02-21 PROCEDURE — 96372 THER/PROPH/DIAG INJ SC/IM: CPT | Performed by: INTERNAL MEDICINE

## 2024-02-21 RX ORDER — TESTOSTERONE CYPIONATE 200 MG/ML
240 INJECTION, SOLUTION INTRAMUSCULAR ONCE
Status: COMPLETED | OUTPATIENT
Start: 2024-02-21 | End: 2024-02-21

## 2024-02-21 RX ADMIN — TESTOSTERONE CYPIONATE 240 MG: 200 INJECTION, SOLUTION INTRAMUSCULAR at 14:49

## 2024-03-06 ENCOUNTER — NURSE ONLY (OUTPATIENT)
Dept: ENDOCRINOLOGY | Age: 34
End: 2024-03-06
Payer: COMMERCIAL

## 2024-03-06 DIAGNOSIS — E29.1 HYPOGONADISM IN MALE: Primary | ICD-10-CM

## 2024-03-06 PROCEDURE — 96372 THER/PROPH/DIAG INJ SC/IM: CPT | Performed by: INTERNAL MEDICINE

## 2024-03-06 RX ORDER — TESTOSTERONE CYPIONATE 200 MG/ML
240 INJECTION, SOLUTION INTRAMUSCULAR ONCE
Status: COMPLETED | OUTPATIENT
Start: 2024-03-06 | End: 2024-03-06

## 2024-03-06 RX ADMIN — TESTOSTERONE CYPIONATE 240 MG: 200 INJECTION, SOLUTION INTRAMUSCULAR at 15:18

## 2024-03-06 NOTE — PROGRESS NOTES
Patient given Testosterone 240mg IM left gluteal..  Will return in 2 weeks for next injection    Patient tolerated injection well.    Administrations This Visit       testosterone cypionate (DEPOTESTOTERONE CYPIONATE) injection 240 mg       Admin Date  03/06/2024 Action  Given Dose  240 mg Route  [facility] Administered By  Yeimi Jones LPN

## 2024-03-20 ENCOUNTER — NURSE ONLY (OUTPATIENT)
Dept: ENDOCRINOLOGY | Age: 34
End: 2024-03-20
Payer: COMMERCIAL

## 2024-03-20 DIAGNOSIS — E29.1 HYPOGONADISM IN MALE: Primary | ICD-10-CM

## 2024-03-20 PROCEDURE — 96372 THER/PROPH/DIAG INJ SC/IM: CPT | Performed by: INTERNAL MEDICINE

## 2024-03-20 RX ORDER — TESTOSTERONE CYPIONATE 200 MG/ML
240 INJECTION, SOLUTION INTRAMUSCULAR ONCE
Status: COMPLETED | OUTPATIENT
Start: 2024-03-20 | End: 2024-03-20

## 2024-03-20 RX ADMIN — TESTOSTERONE CYPIONATE 240 MG: 200 INJECTION, SOLUTION INTRAMUSCULAR at 14:17

## 2024-03-20 NOTE — PROGRESS NOTES
Patient given Testosterone 240mg IM right gluteal..  Will return in 2 weeks for next injection    Patient tolerated injection well.    Administrations This Visit       testosterone cypionate (DEPOTESTOTERONE CYPIONATE) injection 240 mg       Admin Date  03/20/2024 Action  Given Dose  240 mg Route  [facility] Administered By  Yeimi Jones LPN

## 2024-04-03 ENCOUNTER — NURSE ONLY (OUTPATIENT)
Dept: ENDOCRINOLOGY | Age: 34
End: 2024-04-03
Payer: COMMERCIAL

## 2024-04-03 DIAGNOSIS — E29.1 HYPOGONADISM IN MALE: Primary | ICD-10-CM

## 2024-04-03 PROCEDURE — 96372 THER/PROPH/DIAG INJ SC/IM: CPT | Performed by: INTERNAL MEDICINE

## 2024-04-03 RX ORDER — TESTOSTERONE CYPIONATE 200 MG/ML
240 INJECTION, SOLUTION INTRAMUSCULAR ONCE
Status: COMPLETED | OUTPATIENT
Start: 2024-04-03 | End: 2024-04-03

## 2024-04-03 RX ADMIN — TESTOSTERONE CYPIONATE 240 MG: 200 INJECTION, SOLUTION INTRAMUSCULAR at 14:33

## 2024-04-03 NOTE — PROGRESS NOTES
Patient given Testosterone 240mg IM left gluteal..  Will return in 2 weeks for next injection    Patient tolerated injection well.    Administrations This Visit       testosterone cypionate (DEPOTESTOTERONE CYPIONATE) injection 240 mg       Admin Date  04/03/2024 Action  Given Dose  240 mg Route  [facility] Administered By  Yeimi Jones LPN

## 2024-04-17 ENCOUNTER — NURSE ONLY (OUTPATIENT)
Dept: ENDOCRINOLOGY | Age: 34
End: 2024-04-17
Payer: COMMERCIAL

## 2024-04-17 DIAGNOSIS — E29.1 HYPOGONADISM IN MALE: Primary | ICD-10-CM

## 2024-04-17 PROCEDURE — 96372 THER/PROPH/DIAG INJ SC/IM: CPT | Performed by: INTERNAL MEDICINE

## 2024-04-17 RX ORDER — TESTOSTERONE CYPIONATE 200 MG/ML
240 INJECTION, SOLUTION INTRAMUSCULAR ONCE
Status: COMPLETED | OUTPATIENT
Start: 2024-04-17 | End: 2024-04-17

## 2024-04-17 RX ADMIN — TESTOSTERONE CYPIONATE 240 MG: 200 INJECTION, SOLUTION INTRAMUSCULAR at 14:19

## 2024-04-17 NOTE — PROGRESS NOTES
Patient given Testosterone 240mg IM right gluteal..  Will return in 2 weeks for next injection    Patient tolerated injection well.    Administrations This Visit       testosterone cypionate (DEPOTESTOTERONE CYPIONATE) injection 240 mg       Admin Date  04/17/2024 Action  Given Dose  240 mg Route  [facility] Administered By  Yeimi Jones LPN

## 2024-05-01 ENCOUNTER — NURSE ONLY (OUTPATIENT)
Dept: ENDOCRINOLOGY | Age: 34
End: 2024-05-01
Payer: COMMERCIAL

## 2024-05-01 DIAGNOSIS — E29.1 HYPOGONADISM IN MALE: Primary | ICD-10-CM

## 2024-05-01 PROCEDURE — 96372 THER/PROPH/DIAG INJ SC/IM: CPT | Performed by: INTERNAL MEDICINE

## 2024-05-01 RX ORDER — TESTOSTERONE CYPIONATE 200 MG/ML
240 INJECTION, SOLUTION INTRAMUSCULAR ONCE
Status: COMPLETED | OUTPATIENT
Start: 2024-05-01 | End: 2024-05-01

## 2024-05-01 RX ADMIN — TESTOSTERONE CYPIONATE 240 MG: 200 INJECTION, SOLUTION INTRAMUSCULAR at 14:28

## 2024-05-01 NOTE — PROGRESS NOTES
Patient given Testosterone 240mg IM left gluteal..  Will return in 2 weeks for next injection    Patient tolerated injection well.    Administrations This Visit       testosterone cypionate (DEPOTESTOTERONE CYPIONATE) injection 240 mg       Admin Date  05/01/2024 Action  Given Dose  240 mg Route  [facility] Administered By  Yeimi Jones LPN

## 2024-05-08 DIAGNOSIS — E66.01 MORBID OBESITY (HCC): ICD-10-CM

## 2024-05-08 DIAGNOSIS — E29.1 HYPOGONADISM MALE: ICD-10-CM

## 2024-05-08 DIAGNOSIS — E29.1 HYPOGONADISM IN MALE: ICD-10-CM

## 2024-05-08 LAB
ANION GAP SERPL CALCULATED.3IONS-SCNC: 13 MEQ/L (ref 9–15)
BUN SERPL-MCNC: 12 MG/DL (ref 6–20)
CALCIUM SERPL-MCNC: 9.1 MG/DL (ref 8.5–9.9)
CHLORIDE SERPL-SCNC: 107 MEQ/L (ref 95–107)
CHOLEST SERPL-MCNC: 171 MG/DL (ref 0–199)
CO2 SERPL-SCNC: 24 MEQ/L (ref 20–31)
CREAT SERPL-MCNC: 0.89 MG/DL (ref 0.7–1.2)
ERYTHROCYTE [DISTWIDTH] IN BLOOD BY AUTOMATED COUNT: 12.5 % (ref 11.5–14.5)
GLUCOSE SERPL-MCNC: 64 MG/DL (ref 70–99)
HCT VFR BLD AUTO: 50.2 % (ref 42–52)
HDLC SERPL-MCNC: 31 MG/DL (ref 40–59)
HGB BLD-MCNC: 16.9 G/DL (ref 14–18)
LDLC SERPL CALC-MCNC: 96 MG/DL (ref 0–129)
MCH RBC QN AUTO: 28.9 PG (ref 27–31.3)
MCHC RBC AUTO-ENTMCNC: 33.7 % (ref 33–37)
MCV RBC AUTO: 86 FL (ref 79–92.2)
PLATELET # BLD AUTO: 292 K/UL (ref 130–400)
POTASSIUM SERPL-SCNC: 3.9 MEQ/L (ref 3.4–4.9)
RBC # BLD AUTO: 5.84 M/UL (ref 4.7–6.1)
SHBG SERPL-SCNC: 7 NMOL/L (ref 17–56)
SODIUM SERPL-SCNC: 144 MEQ/L (ref 135–144)
TESTOST FREE SERPL-MCNC: 176.8 PG/ML (ref 47–244)
TESTOST SERPL-MCNC: 491 NG/DL (ref 249–836)
TRIGL SERPL-MCNC: 220 MG/DL (ref 0–150)
WBC # BLD AUTO: 7.4 K/UL (ref 4.8–10.8)

## 2024-05-13 NOTE — PATIENT INSTRUCTIONS
Patient Education        Strep Throat: Care Instructions  Your Care Instructions    Strep throat is a bacterial infection that causes sudden, severe sore throat and fever. Strep throat, which is caused by bacteria called streptococcus, is treated with antibiotics. Sometimes a strep test is necessary to tell if the sore throat is caused by strep bacteria. Treatment can help ease symptoms and may prevent future problems. Follow-up care is a key part of your treatment and safety. Be sure to make and go to all appointments, and call your doctor if you are having problems. It's also a good idea to know your test results and keep a list of the medicines you take. How can you care for yourself at home? · Take your antibiotics as directed. Do not stop taking them just because you feel better. You need to take the full course of antibiotics. · Strep throat can spread to others until 24 hours after you begin taking antibiotics. During this time, you should avoid contact with other people at work or home, especially infants and children. Do not sneeze or cough on others, and wash your hands often. Keep your drinking glass and eating utensils separate from those of others, and wash these items well in hot, soapy water. · Gargle with warm salt water at least once each hour to help reduce swelling and make your throat feel better. Use 1 teaspoon of salt mixed in 8 fluid ounces of warm water. · Take an over-the-counter pain medication, such as acetaminophen (Tylenol), ibuprofen (Advil, Motrin), or naproxen (Aleve). Read and follow all instructions on the label. · Try an over-the-counter anesthetic throat spray or throat lozenges, which may help relieve throat pain. · Drink plenty of fluids. Fluids may help soothe an irritated throat. Hot fluids, such as tea or soup, may help your throat feel better. · Eat soft solids and drink plenty of clear liquids.  Flavored ice pops, ice cream, scrambled eggs, sherbet, and gelatin
No

## 2024-05-22 ENCOUNTER — NURSE ONLY (OUTPATIENT)
Dept: ENDOCRINOLOGY | Age: 34
End: 2024-05-22
Payer: COMMERCIAL

## 2024-05-22 DIAGNOSIS — E29.1 HYPOGONADISM IN MALE: Primary | ICD-10-CM

## 2024-05-22 PROCEDURE — 96372 THER/PROPH/DIAG INJ SC/IM: CPT | Performed by: INTERNAL MEDICINE

## 2024-05-22 RX ORDER — TESTOSTERONE CYPIONATE 200 MG/ML
240 INJECTION, SOLUTION INTRAMUSCULAR ONCE
Status: COMPLETED | OUTPATIENT
Start: 2024-05-22 | End: 2024-05-22

## 2024-05-22 RX ADMIN — TESTOSTERONE CYPIONATE 240 MG: 200 INJECTION, SOLUTION INTRAMUSCULAR at 14:53

## 2024-05-22 NOTE — PROGRESS NOTES
Patient given Testosterone 240mg IM right gluteal..  Will return in 2 weeks for next injection    Patient tolerated injection well.    Administrations This Visit       testosterone cypionate (DEPOTESTOTERONE CYPIONATE) injection 240 mg       Admin Date  05/22/2024 Action  Given Dose  240 mg Route  [facility] Administered By  Yeimi Jones LPN

## 2024-06-05 ENCOUNTER — NURSE ONLY (OUTPATIENT)
Dept: ENDOCRINOLOGY | Age: 34
End: 2024-06-05
Payer: COMMERCIAL

## 2024-06-05 ENCOUNTER — OFFICE VISIT (OUTPATIENT)
Dept: ENDOCRINOLOGY | Age: 34
End: 2024-06-05
Payer: COMMERCIAL

## 2024-06-05 VITALS
HEIGHT: 70 IN | OXYGEN SATURATION: 92 % | WEIGHT: 315 LBS | DIASTOLIC BLOOD PRESSURE: 53 MMHG | BODY MASS INDEX: 45.1 KG/M2 | HEART RATE: 100 BPM | SYSTOLIC BLOOD PRESSURE: 91 MMHG

## 2024-06-05 DIAGNOSIS — E66.01 MORBID OBESITY (HCC): ICD-10-CM

## 2024-06-05 DIAGNOSIS — E29.1 HYPOGONADISM IN MALE: Primary | ICD-10-CM

## 2024-06-05 PROCEDURE — 1036F TOBACCO NON-USER: CPT | Performed by: INTERNAL MEDICINE

## 2024-06-05 PROCEDURE — 99213 OFFICE O/P EST LOW 20 MIN: CPT | Performed by: INTERNAL MEDICINE

## 2024-06-05 PROCEDURE — G8417 CALC BMI ABV UP PARAM F/U: HCPCS | Performed by: INTERNAL MEDICINE

## 2024-06-05 PROCEDURE — G8428 CUR MEDS NOT DOCUMENT: HCPCS | Performed by: INTERNAL MEDICINE

## 2024-06-05 PROCEDURE — 96372 THER/PROPH/DIAG INJ SC/IM: CPT | Performed by: PHYSICIAN ASSISTANT

## 2024-06-05 RX ORDER — TESTOSTERONE CYPIONATE 200 MG/ML
240 INJECTION, SOLUTION INTRAMUSCULAR ONCE
Status: COMPLETED | OUTPATIENT
Start: 2024-06-05 | End: 2024-06-05

## 2024-06-05 RX ADMIN — TESTOSTERONE CYPIONATE 240 MG: 200 INJECTION, SOLUTION INTRAMUSCULAR at 14:57

## 2024-06-05 NOTE — PROGRESS NOTES
Patient given Testosterone 240 mg IM right gluteal..  Will return in 2 weeks for next injection    Patient tolerated injection well.

## 2024-06-05 NOTE — PROGRESS NOTES
6/5/2024    Assessment:       Diagnosis Orders   1. Hypogonadism in male        2. Morbid obesity (HCC)              PLAN:     Continue current dose of testosterone 1.2 cc 200 mg/cc every 2 weeks  Orders Placed This Encounter   Procedures    Testosterone, free, total     Standing Status:   Future     Standing Expiration Date:   6/5/2025         Subjective:     Chief Complaint   Patient presents with    Hypogonadism     There were no vitals filed for this visit.  Wt Readings from Last 3 Encounters:   06/05/24 (!) 146.1 kg (322 lb)   02/20/24 (!) 144.7 kg (319 lb)   12/05/23 (!) 144.7 kg (319 lb)     BP Readings from Last 3 Encounters:   06/05/24 (!) 91/53   12/05/23 118/72   11/13/23 126/76     Follow-up on hypogonadism on testosterone injections to the clinic 200 mg every 2 weeks last testosterone level was in the upper 400 range  Morbid obesity BMI 46 history of sleep apnea  Reviewed [facility]    Other  This is a chronic problem. The current episode started more than 1 year ago. The problem has been rapidly improving. Exacerbated by: Obesity. Treatments tried: Testosterone injections. The treatment provided moderate relief.          Latest Reference Range & Units 11/08/23 10:10 05/08/24 11:25   Sex Hormone Binding 17 - 56 nmol/L 9 (L) 7 (L)   Testosterone 249 - 836 ng/dL 597 491   Testosterone, Free 47.0 - 244.0 pg/mL 208.3 176.8   (L): Data is abnormally low    Past Medical History:   Diagnosis Date    Hypogonadism in male     Sleep apnea      Past Surgical History:   Procedure Laterality Date    VASECTOMY  2020    [facility]     Social History     Socioeconomic History    Marital status:      Spouse name: Not on file    Number of children: Not on file    Years of education: Not on file    Highest education level: Not on file   Occupational History    Not on file   Tobacco Use    Smoking status: Never    Smokeless tobacco: Never   Vaping Use    Vaping Use: Not on file   Substance and Sexual Activity    Alcohol use:

## 2024-06-19 ENCOUNTER — NURSE ONLY (OUTPATIENT)
Dept: ENDOCRINOLOGY | Age: 34
End: 2024-06-19
Payer: COMMERCIAL

## 2024-06-19 DIAGNOSIS — E29.1 HYPOGONADISM IN MALE: Primary | ICD-10-CM

## 2024-06-19 PROCEDURE — 96372 THER/PROPH/DIAG INJ SC/IM: CPT | Performed by: INTERNAL MEDICINE

## 2024-06-19 RX ORDER — TESTOSTERONE CYPIONATE 200 MG/ML
240 INJECTION, SOLUTION INTRAMUSCULAR ONCE
Status: COMPLETED | OUTPATIENT
Start: 2024-06-19 | End: 2024-06-19

## 2024-06-19 RX ADMIN — TESTOSTERONE CYPIONATE 240 MG: 200 INJECTION, SOLUTION INTRAMUSCULAR at 15:32

## 2024-06-19 NOTE — PROGRESS NOTES
Patient given Testosterone 240mg IM left gluteal..  Will return in 2 weeks for next injection    Patient tolerated injection well.    Administrations This Visit       testosterone cypionate (DEPOTESTOTERONE CYPIONATE) injection 240 mg       Admin Date  06/19/2024 Action  Given Dose  240 mg Route  [facility] Administered By  Yeimi Jones LPN

## 2024-07-03 ENCOUNTER — NURSE ONLY (OUTPATIENT)
Dept: ENDOCRINOLOGY | Age: 34
End: 2024-07-03
Payer: COMMERCIAL

## 2024-07-03 DIAGNOSIS — E29.1 HYPOGONADISM IN MALE: Primary | ICD-10-CM

## 2024-07-03 PROCEDURE — 96372 THER/PROPH/DIAG INJ SC/IM: CPT | Performed by: INTERNAL MEDICINE

## 2024-07-03 RX ORDER — TESTOSTERONE CYPIONATE 200 MG/ML
240 INJECTION, SOLUTION INTRAMUSCULAR ONCE
Status: COMPLETED | OUTPATIENT
Start: 2024-07-03 | End: 2024-07-03

## 2024-07-03 RX ADMIN — TESTOSTERONE CYPIONATE 240 MG: 200 INJECTION, SOLUTION INTRAMUSCULAR at 15:33

## 2024-07-03 NOTE — PROGRESS NOTES
Patient given Testosterone 240mg IM right gluteal..  Will return in 2 weeks for next injection    Patient tolerated injection well.    Administrations This Visit       testosterone cypionate (DEPOTESTOTERONE CYPIONATE) injection 240 mg       Admin Date  07/03/2024 Action  Given Dose  240 mg Route  [facility] Administered By  Yeimi Jones LPN

## 2024-07-17 ENCOUNTER — NURSE ONLY (OUTPATIENT)
Dept: ENDOCRINOLOGY | Age: 34
End: 2024-07-17
Payer: COMMERCIAL

## 2024-07-17 DIAGNOSIS — E29.1 HYPOGONADISM IN MALE: Primary | ICD-10-CM

## 2024-07-17 PROCEDURE — 96372 THER/PROPH/DIAG INJ SC/IM: CPT | Performed by: INTERNAL MEDICINE

## 2024-07-17 RX ORDER — TESTOSTERONE CYPIONATE 200 MG/ML
240 INJECTION, SOLUTION INTRAMUSCULAR ONCE
Status: COMPLETED | OUTPATIENT
Start: 2024-07-17 | End: 2024-07-17

## 2024-07-17 RX ADMIN — TESTOSTERONE CYPIONATE 240 MG: 200 INJECTION, SOLUTION INTRAMUSCULAR at 14:44

## 2024-07-17 NOTE — PROGRESS NOTES
Patient given Testosterone 240mg IM left gluteal..  Will return in 2 weeks for next injection    Patient tolerated injection well.    Administrations This Visit       testosterone cypionate (DEPOTESTOTERONE CYPIONATE) injection 240 mg       Admin Date  07/17/2024 Action  Given Dose  240 mg Route  [facility] Administered By  Yeimi Jones LPN

## 2024-07-31 ENCOUNTER — NURSE ONLY (OUTPATIENT)
Dept: ENDOCRINOLOGY | Age: 34
End: 2024-07-31
Payer: COMMERCIAL

## 2024-07-31 DIAGNOSIS — E29.1 HYPOGONADISM IN MALE: Primary | ICD-10-CM

## 2024-07-31 PROCEDURE — 96372 THER/PROPH/DIAG INJ SC/IM: CPT | Performed by: INTERNAL MEDICINE

## 2024-07-31 RX ORDER — TESTOSTERONE CYPIONATE 200 MG/ML
240 INJECTION, SOLUTION INTRAMUSCULAR ONCE
Status: COMPLETED | OUTPATIENT
Start: 2024-07-31 | End: 2024-07-31

## 2024-07-31 RX ADMIN — TESTOSTERONE CYPIONATE 240 MG: 200 INJECTION, SOLUTION INTRAMUSCULAR at 15:08

## 2024-08-14 ENCOUNTER — NURSE ONLY (OUTPATIENT)
Dept: ENDOCRINOLOGY | Age: 34
End: 2024-08-14
Payer: COMMERCIAL

## 2024-08-14 DIAGNOSIS — E29.1 HYPOGONADISM IN MALE: Primary | ICD-10-CM

## 2024-08-14 PROCEDURE — 96372 THER/PROPH/DIAG INJ SC/IM: CPT | Performed by: INTERNAL MEDICINE

## 2024-08-14 RX ORDER — TESTOSTERONE CYPIONATE 200 MG/ML
240 INJECTION, SOLUTION INTRAMUSCULAR ONCE
Status: COMPLETED | OUTPATIENT
Start: 2024-08-14 | End: 2024-08-14

## 2024-08-14 RX ADMIN — TESTOSTERONE CYPIONATE 240 MG: 200 INJECTION, SOLUTION INTRAMUSCULAR at 14:52

## 2024-08-14 NOTE — PROGRESS NOTES
Patient given Testosterone 240mg IM left gluteal..  Will return in 2 weeks for next injection    Patient tolerated injection well.    Administrations This Visit       testosterone cypionate (DEPOTESTOTERONE CYPIONATE) injection 240 mg       Admin Date  08/14/2024 Action  Given Dose  240 mg Route  [facility] Documented By  Yeimi Jones LPN

## 2024-09-11 ENCOUNTER — NURSE ONLY (OUTPATIENT)
Dept: ENDOCRINOLOGY | Age: 34
End: 2024-09-11
Payer: COMMERCIAL

## 2024-09-11 DIAGNOSIS — E29.1 HYPOGONADISM IN MALE: Primary | ICD-10-CM

## 2024-09-11 PROCEDURE — 96372 THER/PROPH/DIAG INJ SC/IM: CPT | Performed by: INTERNAL MEDICINE

## 2024-09-11 RX ORDER — TESTOSTERONE CYPIONATE 200 MG/ML
240 INJECTION, SOLUTION INTRAMUSCULAR ONCE
Status: COMPLETED | OUTPATIENT
Start: 2024-09-11 | End: 2024-09-11

## 2024-09-11 RX ADMIN — TESTOSTERONE CYPIONATE 240 MG: 200 INJECTION, SOLUTION INTRAMUSCULAR at 14:20

## 2024-09-25 ENCOUNTER — NURSE ONLY (OUTPATIENT)
Dept: ENDOCRINOLOGY | Age: 34
End: 2024-09-25
Payer: COMMERCIAL

## 2024-09-25 DIAGNOSIS — E29.1 HYPOGONADISM IN MALE: Primary | ICD-10-CM

## 2024-09-25 DIAGNOSIS — E29.1 HYPOGONADISM MALE: ICD-10-CM

## 2024-09-25 PROCEDURE — 96372 THER/PROPH/DIAG INJ SC/IM: CPT | Performed by: INTERNAL MEDICINE

## 2024-09-25 RX ORDER — TESTOSTERONE CYPIONATE 200 MG/ML
240 INJECTION, SOLUTION INTRAMUSCULAR ONCE
Status: COMPLETED | OUTPATIENT
Start: 2024-09-25 | End: 2024-09-25

## 2024-09-25 RX ORDER — TESTOSTERONE CYPIONATE 200 MG/ML
INJECTION, SOLUTION INTRAMUSCULAR
Qty: 10 ML | Refills: 1 | Status: SHIPPED | OUTPATIENT
Start: 2024-09-25 | End: 2025-08-15

## 2024-09-25 RX ADMIN — TESTOSTERONE CYPIONATE 240 MG: 200 INJECTION, SOLUTION INTRAMUSCULAR at 14:57

## 2024-10-09 ENCOUNTER — NURSE ONLY (OUTPATIENT)
Dept: ENDOCRINOLOGY | Age: 34
End: 2024-10-09
Payer: COMMERCIAL

## 2024-10-09 ENCOUNTER — TELEPHONE (OUTPATIENT)
Dept: ENDOCRINOLOGY | Age: 34
End: 2024-10-09

## 2024-10-09 DIAGNOSIS — E29.1 HYPOGONADISM MALE: Primary | ICD-10-CM

## 2024-10-09 PROCEDURE — 96372 THER/PROPH/DIAG INJ SC/IM: CPT | Performed by: INTERNAL MEDICINE

## 2024-10-09 RX ORDER — TESTOSTERONE CYPIONATE 200 MG/ML
240 INJECTION, SOLUTION INTRAMUSCULAR ONCE
Status: COMPLETED | OUTPATIENT
Start: 2024-10-09 | End: 2024-10-09

## 2024-10-09 RX ADMIN — TESTOSTERONE CYPIONATE 240 MG: 200 INJECTION, SOLUTION INTRAMUSCULAR at 14:46

## 2024-10-09 NOTE — PROGRESS NOTES
Patient given Testosterone 240mg IM right gluteal..  Will return in 2 weeks for next injection    Patient tolerated injection well.    Administrations This Visit       testosterone cypionate (DEPOTESTOTERONE CYPIONATE) injection 240 mg       Admin Date  10/09/2024 Action  Given Dose  240 mg Route  [facility] Documented By  Yeimi Jones LPN

## 2024-10-10 DIAGNOSIS — E29.1 HYPOGONADISM MALE: Primary | ICD-10-CM

## 2024-10-10 DIAGNOSIS — E29.1 HYPOGONADISM MALE: ICD-10-CM

## 2024-10-10 RX ORDER — TESTOSTERONE ENANTHATE 75 MG/.5ML
INJECTION SUBCUTANEOUS
Qty: 4 ADJUSTABLE DOSE PRE-FILLED PEN SYRINGE | Refills: 4 | Status: SHIPPED | OUTPATIENT
Start: 2024-10-10 | End: 2024-10-10 | Stop reason: SDUPTHER

## 2024-10-10 RX ORDER — TESTOSTERONE ENANTHATE 75 MG/.5ML
INJECTION SUBCUTANEOUS
Qty: 4 ADJUSTABLE DOSE PRE-FILLED PEN SYRINGE | Refills: 4 | Status: SHIPPED | OUTPATIENT
Start: 2024-10-10

## 2024-10-10 NOTE — TELEPHONE ENCOUNTER
PA was done through [facility] and PA was approved for [facility]. Spoke to pharmacy and patient wait rx to go to other pharmacy

## 2024-10-10 NOTE — TELEPHONE ENCOUNTER
Spoke to patient he want to see if xyosted would get approved, he doesn't think he would  be able to inject himself

## 2025-01-21 ENCOUNTER — OFFICE VISIT (OUTPATIENT)
Dept: FAMILY MEDICINE CLINIC | Age: 35
End: 2025-01-21

## 2025-01-21 ENCOUNTER — HOSPITAL ENCOUNTER (OUTPATIENT)
Dept: GENERAL RADIOLOGY | Age: 35
Discharge: HOME | End: 2025-01-23
Payer: COMMERCIAL

## 2025-01-21 ENCOUNTER — TELEPHONE (OUTPATIENT)
Dept: FAMILY MEDICINE CLINIC | Age: 35
End: 2025-01-21

## 2025-01-21 VITALS
SYSTOLIC BLOOD PRESSURE: 108 MMHG | HEART RATE: 100 BPM | DIASTOLIC BLOOD PRESSURE: 66 MMHG | WEIGHT: 315 LBS | OXYGEN SATURATION: 95 % | BODY MASS INDEX: 45.1 KG/M2 | HEIGHT: 70 IN | TEMPERATURE: 101.2 F

## 2025-01-21 DIAGNOSIS — R05.1 ACUTE COUGH: ICD-10-CM

## 2025-01-21 DIAGNOSIS — R50.9 FEVER, UNSPECIFIED FEVER CAUSE: ICD-10-CM

## 2025-01-21 DIAGNOSIS — R50.9 FEVER, UNSPECIFIED FEVER CAUSE: Primary | ICD-10-CM

## 2025-01-21 LAB
INFLUENZA A ANTIBODY: NORMAL
INFLUENZA B ANTIBODY: NORMAL
Lab: NORMAL
PERFORMING INSTRUMENT: NORMAL
QC PASS/FAIL: NORMAL
SARS-COV-2, POC: NORMAL

## 2025-01-21 PROCEDURE — 71046 X-RAY EXAM CHEST 2 VIEWS: CPT

## 2025-01-21 RX ORDER — PREDNISONE 50 MG/1
50 TABLET ORAL DAILY
Qty: 5 TABLET | Refills: 0 | Status: SHIPPED | OUTPATIENT
Start: 2025-01-21 | End: 2025-01-26

## 2025-01-21 RX ORDER — AZITHROMYCIN 250 MG/1
TABLET, FILM COATED ORAL
Qty: 6 TABLET | Refills: 0 | Status: SHIPPED | OUTPATIENT
Start: 2025-01-21 | End: 2025-01-31

## 2025-01-21 RX ORDER — DEXTROMETHORPHAN HYDROBROMIDE AND PROMETHAZINE HYDROCHLORIDE 15; 6.25 MG/5ML; MG/5ML
5 SYRUP ORAL 4 TIMES DAILY PRN
Qty: 118 ML | Refills: 0 | Status: SHIPPED | OUTPATIENT
Start: 2025-01-21

## 2025-01-21 ASSESSMENT — ENCOUNTER SYMPTOMS
NAUSEA: 0
COUGH: 1
SORE THROAT: 0
WHEEZING: 0
DIARRHEA: 0
SHORTNESS OF BREATH: 1
RHINORRHEA: 0
VOMITING: 0

## 2025-01-21 NOTE — PROGRESS NOTES
Negative for rash.   Neurological:  Negative for headaches.   Psychiatric/Behavioral:  Positive for sleep disturbance. Negative for agitation, confusion and hallucinations.        Objective:   /66 (Site: Left Upper Arm, Position: Sitting, Cuff Size: Large Adult)   Pulse 100   Temp (!) 101.2 °F (38.4 °C)   Ht 1.778 m (5' 10\")   Wt (!) 146.1 kg (322 lb)   SpO2 95%   BMI 46.20 kg/m²     Physical Exam  Vitals reviewed.   Constitutional:       Appearance: Normal appearance.   HENT:      Head: Normocephalic and atraumatic.      Right Ear: Hearing, tympanic membrane, ear canal and external ear normal. No middle ear effusion. No foreign body. Tympanic membrane is not injected, erythematous or bulging.      Left Ear: Hearing, tympanic membrane, ear canal and external ear normal.  No middle ear effusion. No foreign body. Tympanic membrane is not injected, erythematous or bulging.      Nose: Nose normal. No congestion or rhinorrhea.      Right Nostril: No foreign body.      Left Nostril: No foreign body.      Right Turbinates: Not enlarged.      Left Turbinates: Not enlarged.      Right Sinus: No maxillary sinus tenderness or frontal sinus tenderness.      Left Sinus: No maxillary sinus tenderness or frontal sinus tenderness.      Mouth/Throat:      Lips: Pink.      Mouth: Mucous membranes are moist.      Pharynx: Oropharynx is clear. Uvula midline. No pharyngeal swelling, oropharyngeal exudate, posterior oropharyngeal erythema or uvula swelling.      Tonsils: No tonsillar exudate or tonsillar abscesses.   Eyes:      General: Lids are normal.      Conjunctiva/sclera: Conjunctivae normal.      Right eye: Right conjunctiva is not injected.      Left eye: Left conjunctiva is not injected.   Cardiovascular:      Rate and Rhythm: Normal rate and regular rhythm.      Heart sounds: Normal heart sounds, S1 normal and S2 normal.   Pulmonary:      Effort: Pulmonary effort is normal. No tachypnea, accessory muscle usage or

## 2025-06-20 DIAGNOSIS — E29.1 HYPOGONADISM MALE: ICD-10-CM

## 2025-06-20 RX ORDER — TESTOSTERONE ENANTHATE 75 MG/.5ML
INJECTION SUBCUTANEOUS
Qty: 4 ADJUSTABLE DOSE PRE-FILLED PEN SYRINGE | Refills: 4 | Status: SHIPPED | OUTPATIENT
Start: 2025-06-20

## 2025-06-20 NOTE — TELEPHONE ENCOUNTER
Requested Prescriptions     Pending Prescriptions Disp Refills    Testosterone Enanthate (XYOSTED) 75 MG/0.5ML SOAJ 4 Adjustable Dose Pre-filled Pen Syringe 4     Sig: Once a week